# Patient Record
Sex: FEMALE | Race: BLACK OR AFRICAN AMERICAN | Employment: OTHER | URBAN - METROPOLITAN AREA
[De-identification: names, ages, dates, MRNs, and addresses within clinical notes are randomized per-mention and may not be internally consistent; named-entity substitution may affect disease eponyms.]

---

## 2017-02-15 DIAGNOSIS — E11.8 CONTROLLED TYPE 2 DIABETES MELLITUS WITH COMPLICATION, WITHOUT LONG-TERM CURRENT USE OF INSULIN (HCC): ICD-10-CM

## 2017-02-15 DIAGNOSIS — I15.0 RENOVASCULAR HYPERTENSION: ICD-10-CM

## 2017-02-15 DIAGNOSIS — M62.830 SPASM OF LUMBAR PARASPINOUS MUSCLE: ICD-10-CM

## 2017-02-15 RX ORDER — CYCLOBENZAPRINE HCL 10 MG
10 TABLET ORAL
Qty: 30 TAB | Refills: 3 | OUTPATIENT
Start: 2017-02-15

## 2017-02-15 RX ORDER — METFORMIN HYDROCHLORIDE 1000 MG/1
1000 TABLET ORAL 2 TIMES DAILY WITH MEALS
Qty: 60 TAB | Refills: 3 | OUTPATIENT
Start: 2017-02-15

## 2017-02-15 RX ORDER — NAPROXEN 500 MG/1
500 TABLET ORAL
Qty: 60 TAB | Refills: 3 | OUTPATIENT
Start: 2017-02-15

## 2017-02-15 RX ORDER — GLIPIZIDE 10 MG/1
10 TABLET ORAL 2 TIMES DAILY
Qty: 60 TAB | Refills: 3 | OUTPATIENT
Start: 2017-02-15

## 2017-02-15 RX ORDER — LISINOPRIL AND HYDROCHLOROTHIAZIDE 20; 25 MG/1; MG/1
1 TABLET ORAL DAILY
Qty: 30 TAB | Refills: 3 | OUTPATIENT
Start: 2017-02-15

## 2017-02-23 ENCOUNTER — LAB ONLY (OUTPATIENT)
Dept: FAMILY MEDICINE CLINIC | Age: 62
End: 2017-02-23

## 2017-02-23 ENCOUNTER — HOSPITAL ENCOUNTER (OUTPATIENT)
Dept: LAB | Age: 62
Discharge: HOME | End: 2017-02-23

## 2017-02-23 DIAGNOSIS — E11.8 CONTROLLED DIABETES MELLITUS TYPE 2 WITH COMPLICATIONS, UNSPECIFIED LONG TERM INSULIN USE STATUS: ICD-10-CM

## 2017-02-23 DIAGNOSIS — E78.5 HYPERLIPIDEMIA WITH TARGET LDL LESS THAN 100: Primary | ICD-10-CM

## 2017-02-23 DIAGNOSIS — E78.5 HYPERLIPIDEMIA WITH TARGET LDL LESS THAN 100: ICD-10-CM

## 2017-02-23 LAB
ALBUMIN SERPL BCP-MCNC: 4 G/DL (ref 3.4–5)
ALBUMIN/GLOB SERPL: 1.2 {RATIO} (ref 0.8–1.7)
ALP SERPL-CCNC: 83 U/L (ref 45–117)
ALT SERPL-CCNC: 23 U/L (ref 13–56)
ANION GAP BLD CALC-SCNC: 7 MMOL/L (ref 3–18)
AST SERPL W P-5'-P-CCNC: 19 U/L (ref 15–37)
BASOPHILS # BLD AUTO: 0 K/UL (ref 0–0.1)
BASOPHILS # BLD: 1 % (ref 0–2)
BILIRUB SERPL-MCNC: 0.6 MG/DL (ref 0.2–1)
BUN SERPL-MCNC: 23 MG/DL (ref 7–18)
BUN/CREAT SERPL: 19 (ref 12–20)
CALCIUM SERPL-MCNC: 9.4 MG/DL (ref 8.5–10.1)
CHLORIDE SERPL-SCNC: 105 MMOL/L (ref 100–108)
CHOLEST SERPL-MCNC: 149 MG/DL
CO2 SERPL-SCNC: 28 MMOL/L (ref 21–32)
CREAT SERPL-MCNC: 1.24 MG/DL (ref 0.6–1.3)
CREAT UR-MCNC: 169 MG/DL (ref 30–125)
DIFFERENTIAL METHOD BLD: ABNORMAL
EOSINOPHIL # BLD: 0.1 K/UL (ref 0–0.4)
EOSINOPHIL NFR BLD: 2 % (ref 0–5)
ERYTHROCYTE [DISTWIDTH] IN BLOOD BY AUTOMATED COUNT: 13.1 % (ref 11.6–14.5)
EST. AVERAGE GLUCOSE BLD GHB EST-MCNC: 186 MG/DL
GLOBULIN SER CALC-MCNC: 3.4 G/DL (ref 2–4)
GLUCOSE SERPL-MCNC: 107 MG/DL (ref 74–99)
HBA1C MFR BLD: 8.1 % (ref 4.2–5.6)
HCT VFR BLD AUTO: 32.9 % (ref 35–45)
HDLC SERPL-MCNC: 69 MG/DL (ref 40–60)
HDLC SERPL: 2.2 {RATIO} (ref 0–5)
HGB BLD-MCNC: 10.3 G/DL (ref 12–16)
LDLC SERPL CALC-MCNC: 65.4 MG/DL (ref 0–100)
LIPID PROFILE,FLP: ABNORMAL
LYMPHOCYTES # BLD AUTO: 33 % (ref 21–52)
LYMPHOCYTES # BLD: 2.4 K/UL (ref 0.9–3.6)
MAGNESIUM SERPL-MCNC: 1.9 MG/DL (ref 1.8–2.4)
MCH RBC QN AUTO: 27.2 PG (ref 24–34)
MCHC RBC AUTO-ENTMCNC: 31.3 G/DL (ref 31–37)
MCV RBC AUTO: 86.8 FL (ref 74–97)
MICROALBUMIN UR-MCNC: 1.44 MG/DL (ref 0–3)
MICROALBUMIN/CREAT UR-RTO: 9 MG/G (ref 0–30)
MONOCYTES # BLD: 0.7 K/UL (ref 0.05–1.2)
MONOCYTES NFR BLD AUTO: 10 % (ref 3–10)
NEUTS SEG # BLD: 4.1 K/UL (ref 1.8–8)
NEUTS SEG NFR BLD AUTO: 54 % (ref 40–73)
PLATELET # BLD AUTO: 328 K/UL (ref 135–420)
PMV BLD AUTO: 10.6 FL (ref 9.2–11.8)
POTASSIUM SERPL-SCNC: 4.1 MMOL/L (ref 3.5–5.5)
PROT SERPL-MCNC: 7.4 G/DL (ref 6.4–8.2)
RBC # BLD AUTO: 3.79 M/UL (ref 4.2–5.3)
SODIUM SERPL-SCNC: 140 MMOL/L (ref 136–145)
TRIGL SERPL-MCNC: 73 MG/DL (ref ?–150)
VLDLC SERPL CALC-MCNC: 14.6 MG/DL
WBC # BLD AUTO: 7.4 K/UL (ref 4.6–13.2)

## 2017-02-23 PROCEDURE — 80061 LIPID PANEL: CPT | Performed by: NURSE PRACTITIONER

## 2017-02-23 PROCEDURE — 83036 HEMOGLOBIN GLYCOSYLATED A1C: CPT | Performed by: NURSE PRACTITIONER

## 2017-02-23 PROCEDURE — 80053 COMPREHEN METABOLIC PANEL: CPT | Performed by: NURSE PRACTITIONER

## 2017-02-23 PROCEDURE — 82043 UR ALBUMIN QUANTITATIVE: CPT | Performed by: NURSE PRACTITIONER

## 2017-02-23 PROCEDURE — 83735 ASSAY OF MAGNESIUM: CPT | Performed by: NURSE PRACTITIONER

## 2017-02-23 PROCEDURE — 85025 COMPLETE CBC W/AUTO DIFF WBC: CPT | Performed by: NURSE PRACTITIONER

## 2017-02-27 NOTE — PROGRESS NOTES
Will review results with pt at 3/2/17 appt  1. A1C 8.1   2. Lipid panel OK   3. GFR 53 Cr 1.24   4. BUN 23   5. Anemic.

## 2017-03-02 ENCOUNTER — OFFICE VISIT (OUTPATIENT)
Dept: FAMILY MEDICINE CLINIC | Age: 62
End: 2017-03-02

## 2017-03-02 VITALS
SYSTOLIC BLOOD PRESSURE: 113 MMHG | WEIGHT: 203.8 LBS | RESPIRATION RATE: 18 BRPM | TEMPERATURE: 98.9 F | HEIGHT: 63 IN | DIASTOLIC BLOOD PRESSURE: 74 MMHG | BODY MASS INDEX: 36.11 KG/M2 | OXYGEN SATURATION: 100 % | HEART RATE: 102 BPM

## 2017-03-02 DIAGNOSIS — Z12.39 BREAST CANCER SCREENING: ICD-10-CM

## 2017-03-02 DIAGNOSIS — I15.0 RENOVASCULAR HYPERTENSION: ICD-10-CM

## 2017-03-02 DIAGNOSIS — R94.4 DECREASED GFR: ICD-10-CM

## 2017-03-02 DIAGNOSIS — E11.8 TYPE 2 DIABETES MELLITUS WITH COMPLICATION, WITHOUT LONG-TERM CURRENT USE OF INSULIN (HCC): Primary | ICD-10-CM

## 2017-03-02 DIAGNOSIS — Z23 ENCOUNTER FOR IMMUNIZATION: ICD-10-CM

## 2017-03-02 DIAGNOSIS — M62.830 SPASM OF LUMBAR PARASPINOUS MUSCLE: ICD-10-CM

## 2017-03-02 DIAGNOSIS — D64.9 ANEMIA, UNSPECIFIED TYPE: ICD-10-CM

## 2017-03-02 DIAGNOSIS — E66.9 OBESITY (BMI 30-39.9): ICD-10-CM

## 2017-03-02 DIAGNOSIS — Z78.0 POSTMENOPAUSAL: ICD-10-CM

## 2017-03-02 DIAGNOSIS — E78.5 HYPERLIPIDEMIA WITH TARGET LDL LESS THAN 100: ICD-10-CM

## 2017-03-02 RX ORDER — LISINOPRIL AND HYDROCHLOROTHIAZIDE 20; 25 MG/1; MG/1
1 TABLET ORAL DAILY
Qty: 30 TAB | Refills: 3 | Status: SHIPPED | OUTPATIENT
Start: 2017-03-02 | End: 2017-06-01 | Stop reason: DRUGHIGH

## 2017-03-02 RX ORDER — LANOLIN ALCOHOL/MO/W.PET/CERES
325 CREAM (GRAM) TOPICAL
Qty: 30 TAB | Refills: 11 | Status: SHIPPED | OUTPATIENT
Start: 2017-03-02 | End: 2018-06-11 | Stop reason: SDUPTHER

## 2017-03-02 RX ORDER — GLIPIZIDE 10 MG/1
10 TABLET ORAL 2 TIMES DAILY
Qty: 60 TAB | Refills: 3 | Status: SHIPPED | OUTPATIENT
Start: 2017-03-02 | End: 2017-06-01 | Stop reason: SDUPTHER

## 2017-03-02 RX ORDER — METFORMIN HYDROCHLORIDE 1000 MG/1
1000 TABLET ORAL 2 TIMES DAILY WITH MEALS
Qty: 60 TAB | Refills: 3 | Status: SHIPPED | OUTPATIENT
Start: 2017-03-02 | End: 2017-06-01 | Stop reason: SDUPTHER

## 2017-03-02 RX ORDER — CYCLOBENZAPRINE HCL 10 MG
10 TABLET ORAL
Qty: 30 TAB | Refills: 3 | Status: SHIPPED | OUTPATIENT
Start: 2017-03-02 | End: 2017-06-19 | Stop reason: SDUPTHER

## 2017-03-02 NOTE — MR AVS SNAPSHOT
Visit Information Date & Time Provider Department Dept. Phone Encounter #  
 3/2/2017 10:30 AM Michelle Joshi NP [address] [facility] [id number] Follow-up Instructions Return in about 3 months (around 6/2/2017), or if symptoms worsen or fail to improve. Upcoming Health Maintenance Date Due DTaP/Tdap/Td series (1 - Tdap) 10/16/1976 BREAST CANCER SCRN MAMMOGRAM 10/16/2005 FOBT Q 1 YEAR AGE 50-75 10/16/2005 ZOSTER VACCINE AGE 60> 10/16/2015 INFLUENZA AGE 9 TO ADULT 8/1/2016 Pneumococcal 19-64 Medium Risk (1 of 1 - PPSV23) 3/2/2018* EYE EXAM RETINAL OR DILATED Q1 4/6/2017 PAP AKA CERVICAL CYTOLOGY 7/9/2017 HEMOGLOBIN A1C Q6M 8/23/2017 MICROALBUMIN Q1 2/23/2018 LIPID PANEL Q1 2/23/2018 FOOT EXAM Q1 3/2/2018 *Topic was postponed. The date shown is not the original due date. Allergies as of 3/2/2017  Review Complete On: 3/2/2017 By: Xavier Fischer LPN Severity Noted Reaction Type Reactions Codeine  11/28/2012    Nausea and Vomiting Januvia [Sitagliptin]  07/09/2014   Side Effect Nausea Only, Myalgia Pcn [Penicillins]  11/28/2012    Swelling Current Immunizations  Reviewed on 11/28/2012 Name Date Influenza Vaccine Split 11/28/2012 Not reviewed this visit You Were Diagnosed With   
  
 Codes Comments Type 2 diabetes mellitus with complication, without long-term current use of insulin (HCC)    -  Primary ICD-10-CM: E11.8 ICD-9-CM: 250.90 Postmenopausal     ICD-10-CM: Z78.0 ICD-9-CM: V49.81 Breast cancer screening     ICD-10-CM: Z12.39 
ICD-9-CM: V76.10 Anemia, unspecified type     ICD-10-CM: D64.9 ICD-9-CM: 285.9 Hyperlipidemia with target LDL less than 100     ICD-10-CM: E78.5 ICD-9-CM: 272.4 Decreased GFR     ICD-10-CM: R94.4 ICD-9-CM: 794.4 Obesity (BMI 30-39. 9)     ICD-10-CM: E66.9 ICD-9-CM: 278.00   
 Encounter for immunization     ICD-10-CM: V64 ICD-9-CM: V03.89 Spasm of lumbar paraspinous muscle     ICD-10-CM: E77.688 ICD-9-CM: 724.8 Vitals BP  
  
  
  
  
  
 113/74 (BP 1 Location: Left arm, BP Patient Position: Sitting) Vitals History BMI and BSA Data Body Mass Index Body Surface Area  
 36.1 kg/m 2 2.03 m 2 Preferred Pharmacy Pharmacy Name Phone [facility] Jenise Woody [address]. [phone] Your Updated Medication List  
  
   
This list is accurate as of: 3/2/17 10:59 AM.  Always use your most recent med list.  
  
  
  
  
 cyclobenzaprine 10 mg tablet Commonly known as:  FLEXERIL Take 1 Tab by mouth nightly as needed for Muscle Spasm(s). Indications: MUSCLE SPASM  
  
 ferrous sulfate 325 mg (65 mg iron) tablet Take 1 Tab by mouth Daily (before breakfast). Indications: IRON DEFICIENCY ANEMIA  
  
 glipiZIDE 10 mg tablet Commonly known as:  Myrla Nordmann Take 1 Tab by mouth two (2) times a day. lisinopril-hydroCHLOROthiazide 20-25 mg per tablet Commonly known as:  Conda Franc Take 1 Tab by mouth daily. magnesium oxide 400 mg tablet Commonly known as:  MAG-OX Take 1 Tab by mouth daily. metFORMIN 1,000 mg tablet Commonly known as:  GLUCOPHAGE Take 1 Tab by mouth two (2) times daily (with meals). naproxen 500 mg tablet Commonly known as:  NAPROSYN Take 1 Tab by mouth two (2) times daily as needed. Indications: PAIN  
  
 pitavastatin 2 mg tablet Commonly known as:  LIVALO Take 1 Tab by mouth daily. rosuvastatin 20 mg tablet Commonly known as:  CRESTOR Take 1 Tab by mouth nightly. Prescriptions Printed Refills  
 pitavastatin (LIVALO) 2 mg tablet 0 Sig: Take 1 Tab by mouth daily. Class: Program  
 Route: Oral  
  
Prescriptions Sent to Mail Order  Refills  
 pitavastatin (LIVALO) 2 mg tablet 0  
 Sig: Take 1 Tab by mouth daily. Class: Program  
 Pharmacy: [place] [address] [address] [facility]. Ph #: [phone] Route: Oral  
  
Prescriptions Sent to Pharmacy Refills  
 pitavastatin (LIVALO) 2 mg tablet 0 Sig: Take 1 Tab by mouth daily. Class: Program  
 Pharmacy: [place] [address] [address] [facility]. Ph #: [phone] Route: Oral  
 ferrous sulfate 325 mg (65 mg iron) tablet 11 Sig: Take 1 Tab by mouth Daily (before breakfast). Indications: IRON DEFICIENCY ANEMIA Class: Normal  
 Pharmacy: [place] [address]. Ph #: [phone] Route: Oral  
 glipiZIDE (GLUCOTROL) 10 mg tablet 3 Sig: Take 1 Tab by mouth two (2) times a day. Class: Normal  
 Pharmacy: [place] [address]. Ph #: [phone] Route: Oral  
 metFORMIN (GLUCOPHAGE) 1,000 mg tablet 3 Sig: Take 1 Tab by mouth two (2) times daily (with meals). Class: Normal  
 Pharmacy: [place] [address]. Ph #: [phone] Route: Oral  
 cyclobenzaprine (FLEXERIL) 10 mg tablet 3 Sig: Take 1 Tab by mouth nightly as needed for Muscle Spasm(s). Indications: MUSCLE SPASM Class: Normal  
 Pharmacy: [place] [address]. Ph #: [phone] Route: Oral  
  
We Performed the Following [address] [TVF33 Custom] Comments:  
 Referral to Every [address]  Program 
Phone [phone] Fax [phone] [facility] GUIDELINES 
 
18-64 y.o. with problem 38-51 y.o. screening available but on waitlist 
52-64 y.o. screening appt will be scheduled Patient calls for financial screening and appt. [phone] Follow-up Instructions Return in about 3 months (around 6/2/2017), or if symptoms worsen or fail to improve.   
  
To-Do List   
 06/30/2017 Lab:  CBC WITH AUTOMATED DIFF   
  
 06/30/2017 Lab:  HEMOGLOBIN A1C WITH EAG   
  
 06/30/2017 Lab:  LIPID PANEL   
  
 06/30/2017 Lab:  METABOLIC PANEL, COMPREHENSIVE   
  
 06/30/2017 Lab:  MICROALBUMIN, UR, RAND W/ MICROALBUMIN/CREA RATIO Referral Information Referral ID Referred By Referred To  
  
 [id number] Stephanie MCKEON Not Available Visits Status Start Date End Date 1 New Request 3/2/17 3/2/18 If your referral has a status of pending review or denied, additional information will be sent to support the outcome of this decision. Patient Instructions Anemia: Care Instructions Your Care Instructions Anemia is a low level of red blood cells, which carry oxygen throughout your body. Many things can cause anemia. Lack of iron is one of the most common causes. Your body needs iron to make hemoglobin, a substance in red blood cells that carries oxygen from the lungs to your body's cells. Without enough iron, the body produces fewer and smaller red blood cells. As a result, your body's cells do not get enough oxygen, and you feel tired and weak. And you may have trouble concentrating. Bleeding is the most common cause of a lack of iron. You may have heavy menstrual bleeding or bleeding caused by conditions such as ulcers, hemorrhoids, or cancer. Regular use of aspirin or other anti-inflammatory medicines (such as ibuprofen) also can cause bleeding in some people. A lack of iron in your diet also can cause anemia, especially at times when the body needs more iron, such as during pregnancy, infancy, and the teen years. Your doctor may have prescribed iron pills. It may take several months of treatment for your iron levels to return to normal. Your doctor also may suggest that you eat foods that are rich in iron, such as meat and beans. There are many other causes of anemia.  It is not always due to a lack of iron. Finding the specific cause of your anemia will help your doctor find the right treatment for you. Follow-up care is a key part of your treatment and safety. Be sure to make and go to all appointments, and call your doctor if you are having problems. It's also a good idea to know your test results and keep a list of the medicines you take. How can you care for yourself at home? · Take your medicines exactly as prescribed. Call your doctor if you think you are having a problem with your medicine. · If your doctor recommends iron pills, take them as directed: ¨ Try to take the pills on an empty stomach about 1 hour before or 2 hours after meals. But you may need to take iron with food to avoid an upset stomach. ¨ Do not take antacids or drink milk or caffeine drinks (such as coffee, tea, or cola) at the same time or within 2 hours of the time that you take your iron. They can make it hard for your body to absorb the iron. ¨ Vitamin C (from food or supplements) helps your body absorb iron. Try taking iron pills with a glass of orange juice or some other food that is high in vitamin C, such as citrus fruits. ¨ Iron pills may cause stomach problems, such as heartburn, nausea, diarrhea, constipation, and cramps. Be sure to drink plenty of fluids, and include fruits, vegetables, and fiber in your diet each day. Iron pills often make your bowel movements dark or green. ¨ If you forget to take an iron pill, do not take a double dose of iron the next time you take a pill. ¨ Keep iron pills out of the reach of small children. An overdose of iron can be very dangerous. · Follow your doctor's advice about eating iron-rich foods. These include red meat, shellfish, poultry, eggs, beans, raisins, whole-grain bread, and leafy green vegetables. · Steam vegetables to help them keep their iron content. When should you call for help? Call 911 anytime you think you may need emergency care. For example, call if: · You have symptoms of a heart attack. These may include: ¨ Chest pain or pressure, or a strange feeling in the chest. 
¨ Sweating. ¨ Shortness of breath. ¨ Nausea or vomiting. ¨ Pain, pressure, or a strange feeling in the back, neck, jaw, or upper belly or in one or both shoulders or arms. ¨ Lightheadedness or sudden weakness. ¨ A fast or irregular heartbeat. After you call 911, the  may tell you to chew 1 adult-strength or 2 to 4 low-dose aspirin. Wait for an ambulance. Do not try to drive yourself. · You passed out (lost consciousness). Call your doctor now or seek immediate medical care if: 
· You have new or increased shortness of breath. · You are dizzy or lightheaded, or you feel like you may faint. · Your fatigue and weakness continue or get worse. · You have any abnormal bleeding, such as: 
¨ Nosebleeds. ¨ Vaginal bleeding that is different (heavier, more frequent, at a different time of the month) than what you are used to. ¨ Bloody or black stools, or rectal bleeding. ¨ Bloody or pink urine. Watch closely for changes in your health, and be sure to contact your doctor if: 
· You do not get better as expected. Where can you learn more? Go to http://dixon-edilson.info/. Enter R301 in the search box to learn more about \"Anemia: Care Instructions. \" Current as of: February 5, 2016 Content Version: 11.1 © 9635-5862 [employer]. Care instructions adapted under license by [employer] (which disclaims liability or warranty for this information). If you have questions about a medical condition or this instruction, always ask your healthcare professional. [place] [address] any warranty or liability for your use of this information. Learning About Diabetes Food Guidelines Your Care Instructions Meal planning is important to manage diabetes.  It helps keep your blood sugar at a target level (which you set with your doctor). You don't have to eat special foods. You can eat what your family eats, including sweets once in a while. But you do have to pay attention to how often you eat and how much you eat of certain foods. You may want to work with a dietitian or a certified diabetes educator (CDE) to help you plan meals and snacks. A dietitian or CDE can also help you lose weight if that is one of your goals. What should you know about eating carbs? Managing the amount of carbohydrate (carbs) you eat is an important part of healthy meals when you have diabetes. Carbohydrate is found in many foods. · Learn which foods have carbs. And learn the amounts of carbs in different foods. ¨ Bread, cereal, pasta, and rice have about 15 grams of carbs in a serving. A serving is 1 slice of bread (1 ounce), ½ cup of cooked cereal, or 1/3 cup of cooked pasta or rice. ¨ Fruits have 15 grams of carbs in a serving. A serving is 1 small fresh fruit, such as an apple or orange; ½ of a banana; ½ cup of cooked or canned fruit; ½ cup of fruit juice; 1 cup of melon or raspberries; or 2 tablespoons of dried fruit. ¨ Milk and no-sugar-added yogurt have 15 grams of carbs in a serving. A serving is 1 cup of milk or 2/3 cup of no-sugar-added yogurt. ¨ Starchy vegetables have 15 grams of carbs in a serving. A serving is ½ cup of mashed potatoes or sweet potato; 1 cup winter squash; ½ of a small baked potato; ½ cup of cooked beans; or ½ cup cooked corn or green peas. · Learn how much carbs to eat each day and at each meal. A dietitian or CDE can teach you how to keep track of the amount of carbs you eat. This is called carbohydrate counting. · If you are not sure how to count carbohydrate grams, use the Plate Method to plan meals. It is a good, quick way to make sure that you have a balanced meal. It also helps you spread carbs throughout the day. ¨ Divide your plate by types of foods. Put non-starchy vegetables on half the plate, meat or other protein food on one-quarter of the plate, and a grain or starchy vegetable in the final quarter of the plate. To this you can add a small piece of fruit and 1 cup of milk or yogurt, depending on how many carbs you are supposed to eat at a meal. 
· Try to eat about the same amount of carbs at each meal. Do not \"save up\" your daily allowance of carbs to eat at one meal. 
· Proteins have very little or no carbs per serving. Examples of proteins are beef, chicken, turkey, fish, eggs, tofu, cheese, cottage cheese, and peanut butter. A serving size of meat is 3 ounces, which is about the size of a deck of cards. Examples of meat substitute serving sizes (equal to 1 ounce of meat) are 1/4 cup of cottage cheese, 1 egg, 1 tablespoon of peanut butter, and ½ cup of tofu. How can you eat out and still eat healthy? · Learn to estimate the serving sizes of foods that have carbohydrate. If you measure food at home, it will be easier to estimate the amount in a serving of restaurant food. · If the meal you order has too much carbohydrate (such as potatoes, corn, or baked beans), ask to have a low-carbohydrate food instead. Ask for a salad or green vegetables. · If you use insulin, check your blood sugar before and after eating out to help you plan how much to eat in the future. · If you eat more carbohydrate at a meal than you had planned, take a walk or do other exercise. This will help lower your blood sugar. What else should you know? · Limit saturated fat, such as the fat from meat and dairy products. This is a healthy choice because people who have diabetes are at higher risk of heart disease. So choose lean cuts of meat and nonfat or low-fat dairy products. Use olive or canola oil instead of butter or shortening when cooking. · Don't skip meals.  Your blood sugar may drop too low if you skip meals and take insulin or certain medicines for diabetes. · Check with your doctor before you drink alcohol. Alcohol can cause your blood sugar to drop too low. Alcohol can also cause a bad reaction if you take certain diabetes medicines. Follow-up care is a key part of your treatment and safety. Be sure to make and go to all appointments, and call your doctor if you are having problems. It's also a good idea to know your test results and keep a list of the medicines you take. Where can you learn more? Go to http://dixon-edilson.info/. Enter X202 in the search box to learn more about \"Learning About Diabetes Food Guidelines. \" Current as of: May 23, 2016 Content Version: 11.1 © 1135-7053 [employer]. Care instructions adapted under license by Privy (which disclaims liability or warranty for this information). If you have questions about a medical condition or this instruction, always ask your healthcare professional. [place] [address] any warranty or liability for your use of this information. The [facility] reminders! Foundation Operating Hours: These may change without notice. Mon- Wed 7am to 5pm. Closed for lunch 12-1pm 
Thurs 7am to 12pm 
Fridays closed NO SHOW POLICY ~ If a patient has 6 no shows for an appointment with the Provider, Mental Health Provider, or the Nurse Navigator in 6 months, they will be discharged from the practice for 6 months. Medication ordering will also be suspended. If the patient is discharged from the [facility], they can go to the [place] [address] where they can be seen for the primary needs plus obtain the same types of medications as they receive at the [facility]. To avoid being discharged, the patient must call the office at [phone] 24 hours prior to their appointment if they need to cancel, arrive to their appointments on time and come to all scheduled appointments. If the patient is discharged from the practice, they can apply to be re-established after 12 months. Lab work:  Unless you are instructed differently, please return to the office between the hours of 7 am and 10:30 am Monday through Thursday to have your labs drawn one week before your next scheduled PROVIDER visit. If you do not have an appointment to follow up on these results, please make one or plan to call the office if you do not hear from us to get the results. No news does not mean good news. Medications: If your medications are new or have changed, and you get your medications from the clinic pharmacy ([facility]), you MUST talk to the pharmacy staff to sign the new prescription applications. If you don't sign the applications we cannot get the medications for you. It usually takes 6-8 weeks for your medications to arrive. The Pharmacy staff will call you when your medications are available. You will have 30 days to come in and  your medications. If you don't  your medicines within those 30 days, those medicines will be placed on the self as samples and you will have to start all over again by completing the applications and waiting the 6-8 weeks for your medicines to arrive. This is firm and there will be no exceptions! ! The [facility] or [facility] will assist you with your medications if available but not all medications are available so some may be obtained through local pharmacies such as [facility] that has a large $4 list and [facility] that has many drugs for free or also for $4.  We will work hard to get the best medications for you that you can also afford. Feet Care: Local ministry through [facility] Every second Tuesday of the month (except for holidays and election days) from 9am to 1 pm. The services provided by these ministry volunteers are free of charge with the option to donate.  They will inspect your feet thoroughly, soak them for 10 minutes, cut and file your nails. They care for diabetics as well. Keep in mind this service is free and will be on a first come first serve basis. Bad teeth? Ask about the Dental Bus to get you in front of a local dentist. The bus leaves every other Wednesday for those on the list. (Ask about availability as these appointments are limited) Eye exams for Diabetics. Please let us know so we can add you to the list to see the eye doctor at [facility]. You will receive a free eye exam and free glasses if needed. Unfortunately, if you are not a diabetic, we do not have a free service for eye exams for you (yet!). We do have information on where to go to get a huge discount on eye exams and glasses. Sick visits: If you are sick and it is not an emergency call the office to see if you can schedule an appointment. Charges and cost items from the clinic:  Most of our orders are covered by [employer] but there ARE SOME CHARGES for items such as radiology interpretations and anesthesiology during procedures and surgeries. Please make sure you have contacted the Advanced Patient Advocacy (APA) group to check on your payment options: [email or web address]. or come in and talk to them in person: On 
Tuesdays, we have Advanced Patient Advocacy is available Mon - Fri 8-4pm at [facility] on the first floor by the information desk. Their number is [phone]. It is important that you are screened in order to qualify for assistance and to avoid huge medical charges. The [facility] is not responsible for ANY charges you may accrue regardless of who ordered the medication, procedure, treatment or test. If you go to the Emergency Room, you WILL be charged! Behavior and emotional issues! It is stressful to be sick, have an illness, take medications, not have a job, not have medical insurance, have family issues or just getting older!   Schedule an appointment with our mental health provider. She is in the office Mondays and Wednesdays from 8am to [place] [address] can also contact the following: The national suicide hotline (2-299-093-PKVY or 5-459.683.6751) [address] [facility] [address] [facility], [address] [facility] 
[phone] [employer] ([employer]) - [place] [address] [facility], [address] 
[phone] Immunizations/Vaccination Routine Immunizations are provided for infants, children, teens, and adults at the [facility]. Please bring all immunization records with you and present them at the time of registration. Phone [phone] Hours (Walk in) Monday, Tuesday, Wednesday and Friday 8:00 AM to 11:00 AM 
12:30 PM to 3:00 PM 
 
 
Drug and Alcohol Addiction Issues! It is hard to stop a poor habit but there is help out there. Please feel free to attend any other the following support groups to help you kick the habit or go to [facility] Emergency Department to be evaluated by the psychiatric team. Never give up!! AlAnon meetings: Cristhian Gillis, Ari davis [place] MONDAY 7:30 PM JUST FOR TODAY [employer] [facility] [place] [facility] WEDNESDAY 10:30 AM [place] [employer] [employer] [facility], ROOM [address] [place] [address]  
 
[facility] WEDNESDAY 8:00 PM Cecy Brewster [facility] THURSDAY 8:00 PM KEEP IT SIMPLE [employer] [facility]on [facility] [address] 8:00 PM LET IT BEGIN WITH ME [employer] Al-Anon [facility] [address] [facility] FRIDAY 6;30 PM [place] PARENTS [employer] [place] [facility] [address] [facility] [address] [place] MONDAY 10:30  [place] [address] [place] SATURDAY 8:00 PM [facility] SATURDAY NIGHT [employer] Al-Anon [facility] [address] [facility] [place] MONDAY 7:00 PM MONDAY NIGHT [employer]G Mary [facility] [address]  
 
[facility] WEDNES[facility]:00 PM [employer]EN[employer] SEEK[employer]-[place] [facility] [address] [facility] & [facility]! Melodie Lang introduces [employer] patient portal. Now you can access parts of your medical record, email your doctor's office, and request medication refills online. 1. In your internet browser, go to [email or web address] 2. Click on the First Time User? Click Here link in the Sign In box. You will see the New Member Sign Up page. 3. Enter your [employer] Access Code exactly as it appears below. You will not need to use this code after youve completed the sign-up process. If you do not sign up before the expiration date, you must request a new code. · [employer] Access Code: [email or web address] Expires: 5/31/2017 10:55 AM 
 
4. Enter the last four digits of your Social Security Number (xxxx) and Date of Birth (mm/dd/yyyy) as indicated and click Submit. You will be taken to the next sign-up page. 5. Create a [employer] ID. This will be your [employer] login ID and cannot be changed, so think of one that is secure and easy to remember. 6. Create a [employer] password. You can change your password at any time. 7. Enter your Password Reset Question and Answer. This can be used at a later time if you forget your password. 8. Enter your e-mail address. You will receive e-mail notification when new information is available in [address] 9. Click Sign Up. You can now view and download portions of your medical record. 10. Click the Download Summary menu link to download a portable copy of your medical information. If you have questions, please visit the Frequently Asked Questions section of the [employer] website. Remember, [employer] is NOT to be used for urgent needs. For medical emergencies, dial 911. Now available from your iPhone and Android! Please provide this summary of care documentation to your next provider. Your primary care clinician is listed as Tena Meyer. If you have any questions after today's visit, please call [phone].

## 2017-03-02 NOTE — PROGRESS NOTES
[address]  [address] [facility], [address] [place] [address] Avenue  [phone] office/[phone] fax      3/2/2017    Reason for visit:   Chief Complaint   Patient presents with    Results    Medication Refill     Patient states that she has been out of prinzide, glucaphage, and glucotrol for 3 days.  Follow Up Chronic Condition       Patient: Connor Paniagua, 1955, [id number]       Primary MD: Lluvia Melton NP    Subjective:   Connor Paniagua, a 64 y.o. female, who presents for Results; Medication Refill (Patient states that she has been out of prinzide, glucaphage, and glucotrol for 3 days.); and Follow Up Chronic Condition      HPI  Diabetes Follow Up     Current symptoms/problems include none and have been stable. Known diabetic complications: none  Cardiovascular risk factors: dyslipidemia, diabetes mellitus, post-menopausal  Current diabetic medications include metformin and glucotrol. Eye exam current (within one year): yes  Weight trend: stable  Prior visit with dietician: no  Current diet: \"unhealthy\" diet in general  Increase intake in sodas  Current exercise: no regular exercise    Current monitoring regimen: none  Home blood sugar records: trend: increasing steadily  Any episodes of hypoglycemia? no    Is She on ACE inhibitor or angiotensin II receptor blocker?    Yes   lisinopril (generic)    Past Medical History:   Diagnosis Date    Anemia     Arthritis     Back pain     Diabetes ([address]     Diabetes mellitus type 2, controlled ([address] 6/30/2016    High cholesterol     Hypertension     Right arm weakness 3/3/15    pending EMG 4/22/15    Right knee DJD 1980    Cortisone shots/Mobic/ original injury    Right sciatic nerve pain 3/3/15    on Dr. 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 30 Tab 3    naproxen (NAPROSYN) 500 mg tablet Take 1 Tab by mouth two (2) times daily as needed. Indications: PAIN 60 Tab 3    magnesium oxide (MAG-OX) 400 mg tablet Take 1 Tab by mouth daily. 30 Tab 11    rosuvastatin (CRESTOR) 20 mg tablet Take 1 Tab by mouth nightly. 90 Tab 3     No current facility-administered medications on file prior to visit. Review of Systems   Constitutional: Negative. HENT: Negative. Eyes: Negative. Respiratory: Negative. Cardiovascular: Negative. Gastrointestinal: Positive for nausea. Negative for abdominal pain, blood in stool, constipation, diarrhea, heartburn, melena and vomiting. Genitourinary: Negative. Musculoskeletal: Positive for back pain and joint pain (Right Knee pain ). Skin: Negative. Neurological: Negative. Endo/Heme/Allergies: Negative. Psychiatric/Behavioral: Negative.         Objective:   Visit Vitals    /74 (BP 1 Location: Left arm, BP Patient Position: Sitting)    Pulse (!) 102    Temp 98.9 °F (37.2 °C) (Oral)    Resp 18    Ht 5' 3\" (1.6 m)    Wt 203 lb 12.8 oz (92.4 kg)    SpO2 100%    BMI 36.1 kg/m2      Wt Readings from Last 3 Encounters:   03/02/17 203 lb 12.8 oz (92.4 kg)   03/22/16 199 lb 9.6 oz (90.5 kg)   10/14/15 204 lb 6.4 oz (92.7 kg)     Lab Results   Component Value Date/Time    Glucose 107 02/23/2017 08:20 AM    Glucose  03/03/2015 09:34 AM         Physical Exam   Constitutional: She is oriented to person, place, and time. She appears well-developed and well-nourished. HENT:   Head: Normocephalic. Eyes: Pupils are equal, round, and reactive to light. Neck: Normal range of motion. Neck supple. No JVD present. Carotid bruit is not present. Cardiovascular: Normal rate, regular rhythm, normal heart sounds and intact distal pulses. No murmur heard. Pulmonary/Chest: Effort normal and breath sounds normal. No respiratory distress. Abdominal: Soft. Bowel sounds are normal.   Musculoskeletal: Normal range of motion. Neurological: She is alert and oriented to person, place, and time. She has normal reflexes. Skin: Skin is warm and dry. Psychiatric: She has a normal mood and affect. Her behavior is normal.   Vitals reviewed. Assessment:    Graciela White who has risk factors including (see above previous medical hx) and:       ICD-10-CM ICD-9-CM    1. Type 2 diabetes mellitus with complication, without long-term current use of insulin (HCC) E11.8 250.90 HEMOGLOBIN A1C WITH EAG      METABOLIC PANEL, COMPREHENSIVE      MICROALBUMIN, UR, RAND W/ MICROALBUMIN/CREA RATIO      LIPID PANEL      glipiZIDE (GLUCOTROL) 10 mg tablet      metFORMIN (GLUCOPHAGE) 1,000 mg tablet   2. Postmenopausal Z78.0 V49.81    3. Breast cancer screening Z12.39 S54.11 REFERRAL TO PUBLIC HEALTH   4. Anemia, unspecified type D64.9 285.9 CBC WITH AUTOMATED DIFF      ferrous sulfate 325 mg (65 mg iron) tablet   5. Hyperlipidemia with target LDL less than 100 E78.5 272.4 pitavastatin (LIVALO) 2 mg tablet   6. Decreased GFR R94.4 794.4    7. Obesity (BMI 30-39. 9) E66.9 278.00    8. Encounter for immunization Z23 V03.89    9.  Spasm of lumbar paraspinous muscle M62.830 724.8 cyclobenzaprine (FLEXERIL) 10 mg tablet     1. Type 2 diabetes mellitus with complication, without long-term current use of insulin ([facility]  -The patient is asked to make an attempt to improve diet and exercise patterns to aid in medical management of this problem. - HEMOGLOBIN A1C WITH EAG; Future  - METABOLIC PANEL, COMPREHENSIVE; Future  - MICROALBUMIN, UR, RAND W/ MICROALBUMIN/CREA RATIO; Future  - LIPID PANEL; Future  - glipiZIDE (GLUCOTROL) 10 mg tablet; Take 1 Tab by mouth two (2) times a day. Dispense: 60 Tab; Refill: 3  - metFORMIN (GLUCOPHAGE) 1,000 mg tablet; Take 1 Tab by mouth two (2) times daily (with meals). Dispense: 60 Tab; Refill: 3    2. Postmenopausal  -Continue multivitamin with vitamin D and calcium     3. Breast cancer screening  - REFERRAL TO PUBLIC HEALTH    4. Anemia, unspecified type  - CBC WITH AUTOMATED DIFF; Future  - ferrous sulfate 325 mg (65 mg iron) tablet; Take 1 Tab by mouth Daily (before breakfast). Indications: IRON DEFICIENCY ANEMIA  Dispense: 30 Tab; Refill: 11    5. Hyperlipidemia with target LDL less than 100  -Pt is currently taking crestor which is no longer available via [facility]. Pt to continue crestor. Pt is to call [facility] 2-4 weeks before running out of crestor to obtain the Livalo. Pt must complete the crestor first then start the Livalo. - pitavastatin (LIVALO) 2 mg tablet; Take 1 Tab by mouth daily. Dispense: 90 Tab; Refill: 0    6. Decreased GFR  -The patient reports that she doesn't take Naproxyn every day and it is sporadic. Educated on importance to limit NSAIDs.   - Educated on better DM control.   -Will continue to monitor since previous GFR was >60 3 months prior    7.  Obesity (BMI 30-39.9)  -Weight management: Discussed importance of maintaining a normal weight through healthy dietary changes and aerobic exercise on a daily basis of 30 min or more to decrease need for additional medications, reduction of blood glucose/blood pressure/ dementia/osteoporosis/stress and depression. Aerobic exercise was described as an activity that makes them sweaty and elevates their heart rate such as walking, running, bike, treadmill, stair climbing, joining a gym or swimming. Discussed the patient's above normal BMI with her. Recommended the following interventions: dietary management education, guidance, counseling, exercise and monitoring weight. BMI is out of normal parameters and plan is as follows: Discussed in great detail on diet, portion control, exercise, avoiding foods high in sugar, carbs and starches, mealtimes and to eat until satisfied not til full. I have counseled this patient on diet and exercise regimens      8. Encounter for immunization  -Flu shot given     9. Spasm of lumbar paraspinous muscle  - cyclobenzaprine (FLEXERIL) 10 mg tablet; Take 1 Tab by mouth nightly as needed for Muscle Spasm(s). Indications: MUSCLE SPASM  Dispense: 30 Tab; Refill: 3      Written instructions followed our verbal discussion of all information discussed above, pending tests ordered and future goals/plans. Patient expressed understanding of current diagnosis, planned testing, follow up and if needed to contact the office for any questions or concerns prior to the next visit. Plan:   Med reconciliation completed with patient. Reviewed side effects of medications with the patient. Questions were answered and patient verb understanding. Discussed lab results with patient  Will review results with pt at 3/2/17 appt  1. A1C 8.1   2. Lipid panel OK   3. GFR 53 Cr 1.24   4. BUN 23   5. Anemic.     Orders Placed This Encounter    HEMOGLOBIN A1C WITH EAG     Standing Status:   Future     Standing Expiration Date:   3/3/2018    CBC WITH AUTOMATED DIFF     Standing Status:   Future     Standing Expiration Date:   9/80/1306    METABOLIC PANEL, COMPREHENSIVE     Standing Status:   Future     Standing Expiration Date:   7/30/2017    MICROALBUMIN, UR, RAND W/ MICROALBUMIN/CREA RATIO     Standing Status:   Future     Standing Expiration Date:   8/9/2017    LIPID PANEL     Standing Status:   Future     Standing Expiration Date:   7/30/2017    REFERRAL TO PUBLIC HEALTH     Referral Priority:   Routine     Referral Type:   Consultation     Referral Reason:   Specialty Services Required    pitavastatin (LIVALO) 2 mg tablet     Sig: Take 1 Tab by mouth daily. Dispense:  90 Tab     Refill:  0    ferrous sulfate 325 mg (65 mg iron) tablet     Sig: Take 1 Tab by mouth Daily (before breakfast). Indications: IRON DEFICIENCY ANEMIA     Dispense:  30 Tab     Refill:  11    glipiZIDE (GLUCOTROL) 10 mg tablet     Sig: Take 1 Tab by mouth two (2) times a day. Dispense:  60 Tab     Refill:  3    metFORMIN (GLUCOPHAGE) 1,000 mg tablet     Sig: Take 1 Tab by mouth two (2) times daily (with meals). Dispense:  60 Tab     Refill:  3    cyclobenzaprine (FLEXERIL) 10 mg tablet     Sig: Take 1 Tab by mouth nightly as needed for Muscle Spasm(s). Indications: MUSCLE SPASM     Dispense:  30 Tab     Refill:  3     Current Outpatient Prescriptions   Medication Sig Dispense Refill    pitavastatin (LIVALO) 2 mg tablet Take 1 Tab by mouth daily. 90 Tab 0    ferrous sulfate 325 mg (65 mg iron) tablet Take 1 Tab by mouth Daily (before breakfast). Indications: IRON DEFICIENCY ANEMIA 30 Tab 11    glipiZIDE (GLUCOTROL) 10 mg tablet Take 1 Tab by mouth two (2) times a day. 60 Tab 3    metFORMIN (GLUCOPHAGE) 1,000 mg tablet Take 1 Tab by mouth two (2) times daily (with meals). 60 Tab 3    cyclobenzaprine (FLEXERIL) 10 mg tablet Take 1 Tab by mouth nightly as needed for Muscle Spasm(s). Indications: MUSCLE SPASM 30 Tab 3    lisinopril-hydrochlorothiazide (PRINZIDE, ZESTORETIC) 20-25 mg per tablet Take 1 Tab by mouth daily. 30 Tab 3    naproxen (NAPROSYN) 500 mg tablet Take 1 Tab by mouth two (2) times daily as needed.  Indications: PAIN 60 Tab 3    magnesium oxide (MAG-OX) 400 mg tablet Take 1 Tab by mouth daily. 30 Tab 11    rosuvastatin (CRESTOR) 20 mg tablet Take 1 Tab by mouth nightly. 90 Tab 3     Medications Discontinued During This Encounter   Medication Reason    ferrous sulfate 325 mg (65 mg iron) tablet Reorder    glipiZIDE (GLUCOTROL) 10 mg tablet Reorder    metFORMIN (GLUCOPHAGE) 1,000 mg tablet Reorder    cyclobenzaprine (FLEXERIL) 10 mg tablet Reorder       Follow-up Disposition:  Return in about 3 months (around 6/2/2017), or if symptoms worsen or fail to improve. Labs needed for follow-up appt    \"No Show policy was reviewed with the patient. The services affected are the nurse navigator and the provider. No show appointments include missing labs for a future scheduled appointment, Pap/pelvics, arriving to appointment more than 10 minutes late, and calling to cancel appointment less than 24 hours in advance. After the 6th No Show, the patient will be removed from the [facility] to include medications for 6 months. The patient will be referred to the [place] [id number] for their primary care needs. \"     Henrietta Khan, [address]      I spent 35 minutes with the patient in face-to-face consultation, of which greater than 50% was spent in counseling and coordination of care as described above.

## 2017-03-02 NOTE — LETTER
3/2/2017 [facility] [address] [facility], [address], 1955, is picking up the following medications ordered from the [facility]: 
 
CRESTOR 20 MG #90 [address] [facility] Patient's Signature: _____________________________ Today's Date: 3/2/2017

## 2017-03-02 NOTE — PATIENT INSTRUCTIONS
Anemia: Care Instructions  Your Care Instructions    Anemia is a low level of red blood cells, which carry oxygen throughout your body. Many things can cause anemia. Lack of iron is one of the most common causes. Your body needs iron to make hemoglobin, a substance in red blood cells that carries oxygen from the lungs to your body's cells. Without enough iron, the body produces fewer and smaller red blood cells. As a result, your body's cells do not get enough oxygen, and you feel tired and weak. And you may have trouble concentrating. Bleeding is the most common cause of a lack of iron. You may have heavy menstrual bleeding or bleeding caused by conditions such as ulcers, hemorrhoids, or cancer. Regular use of aspirin or other anti-inflammatory medicines (such as ibuprofen) also can cause bleeding in some people. A lack of iron in your diet also can cause anemia, especially at times when the body needs more iron, such as during pregnancy, infancy, and the teen years. Your doctor may have prescribed iron pills. It may take several months of treatment for your iron levels to return to normal. Your doctor also may suggest that you eat foods that are rich in iron, such as meat and beans. There are many other causes of anemia. It is not always due to a lack of iron. Finding the specific cause of your anemia will help your doctor find the right treatment for you. Follow-up care is a key part of your treatment and safety. Be sure to make and go to all appointments, and call your doctor if you are having problems. It's also a good idea to know your test results and keep a list of the medicines you take. How can you care for yourself at home? · Take your medicines exactly as prescribed. Call your doctor if you think you are having a problem with your medicine. · If your doctor recommends iron pills, take them as directed:  ¨ Try to take the pills on an empty stomach about 1 hour before or 2 hours after meals. But you may need to take iron with food to avoid an upset stomach. ¨ Do not take antacids or drink milk or caffeine drinks (such as coffee, tea, or cola) at the same time or within 2 hours of the time that you take your iron. They can make it hard for your body to absorb the iron. ¨ Vitamin C (from food or supplements) helps your body absorb iron. Try taking iron pills with a glass of orange juice or some other food that is high in vitamin C, such as citrus fruits. ¨ Iron pills may cause stomach problems, such as heartburn, nausea, diarrhea, constipation, and cramps. Be sure to drink plenty of fluids, and include fruits, vegetables, and fiber in your diet each day. Iron pills often make your bowel movements dark or green. ¨ If you forget to take an iron pill, do not take a double dose of iron the next time you take a pill. ¨ Keep iron pills out of the reach of small children. An overdose of iron can be very dangerous. · Follow your doctor's advice about eating iron-rich foods. These include red meat, shellfish, poultry, eggs, beans, raisins, whole-grain bread, and leafy green vegetables. · Steam vegetables to help them keep their iron content. When should you call for help? Call 911 anytime you think you may need emergency care. For example, call if:  · You have symptoms of a heart attack. These may include:  ¨ Chest pain or pressure, or a strange feeling in the chest.  ¨ Sweating. ¨ Shortness of breath. ¨ Nausea or vomiting. ¨ Pain, pressure, or a strange feeling in the back, neck, jaw, or upper belly or in one or both shoulders or arms. ¨ Lightheadedness or sudden weakness. ¨ A fast or irregular heartbeat. After you call 911, the  may tell you to chew 1 adult-strength or 2 to 4 low-dose aspirin. Wait for an ambulance. Do not try to drive yourself. · You passed out (lost consciousness).   Call your doctor now or seek immediate medical care if:  · You have new or increased shortness of breath. · You are dizzy or lightheaded, or you feel like you may faint. · Your fatigue and weakness continue or get worse. · You have any abnormal bleeding, such as:  ¨ Nosebleeds. ¨ Vaginal bleeding that is different (heavier, more frequent, at a different time of the month) than what you are used to. ¨ Bloody or black stools, or rectal bleeding. ¨ Bloody or pink urine. Watch closely for changes in your health, and be sure to contact your doctor if:  · You do not get better as expected. Where can you learn more? Go to http://dixon-edilson.info/. Enter R301 in the search box to learn more about \"Anemia: Care Instructions. \"  Current as of: February 5, 2016  Content Version: 11.1  © 5460-1768 [employer]. Care instructions adapted under license by [employer] (which disclaims liability or warranty for this information). If you have questions about a medical condition or this instruction, always ask your healthcare professional. [place] [address] any warranty or liability for your use of this information. Learning About Diabetes Food Guidelines  Your Care Instructions  Meal planning is important to manage diabetes. It helps keep your blood sugar at a target level (which you set with your doctor). You don't have to eat special foods. You can eat what your family eats, including sweets once in a while. But you do have to pay attention to how often you eat and how much you eat of certain foods. You may want to work with a dietitian or a certified diabetes educator (CDE) to help you plan meals and snacks. A dietitian or CDE can also help you lose weight if that is one of your goals. What should you know about eating carbs? Managing the amount of carbohydrate (carbs) you eat is an important part of healthy meals when you have diabetes. Carbohydrate is found in many foods. · Learn which foods have carbs.  And learn the amounts of carbs in different foods.  ¨ Bread, cereal, pasta, and rice have about 15 grams of carbs in a serving. A serving is 1 slice of bread (1 ounce), ½ cup of cooked cereal, or 1/3 cup of cooked pasta or rice. ¨ Fruits have 15 grams of carbs in a serving. A serving is 1 small fresh fruit, such as an apple or orange; ½ of a banana; ½ cup of cooked or canned fruit; ½ cup of fruit juice; 1 cup of melon or raspberries; or 2 tablespoons of dried fruit. ¨ Milk and no-sugar-added yogurt have 15 grams of carbs in a serving. A serving is 1 cup of milk or 2/3 cup of no-sugar-added yogurt. ¨ Starchy vegetables have 15 grams of carbs in a serving. A serving is ½ cup of mashed potatoes or sweet potato; 1 cup winter squash; ½ of a small baked potato; ½ cup of cooked beans; or ½ cup cooked corn or green peas. · Learn how much carbs to eat each day and at each meal. A dietitian or CDE can teach you how to keep track of the amount of carbs you eat. This is called carbohydrate counting. · If you are not sure how to count carbohydrate grams, use the Plate Method to plan meals. It is a good, quick way to make sure that you have a balanced meal. It also helps you spread carbs throughout the day. ¨ Divide your plate by types of foods. Put non-starchy vegetables on half the plate, meat or other protein food on one-quarter of the plate, and a grain or starchy vegetable in the final quarter of the plate. To this you can add a small piece of fruit and 1 cup of milk or yogurt, depending on how many carbs you are supposed to eat at a meal.  · Try to eat about the same amount of carbs at each meal. Do not \"save up\" your daily allowance of carbs to eat at one meal.  · Proteins have very little or no carbs per serving. Examples of proteins are beef, chicken, turkey, fish, eggs, tofu, cheese, cottage cheese, and peanut butter. A serving size of meat is 3 ounces, which is about the size of a deck of cards.  Examples of meat substitute serving sizes (equal to 1 ounce of meat) are 1/4 cup of cottage cheese, 1 egg, 1 tablespoon of peanut butter, and ½ cup of tofu. How can you eat out and still eat healthy? · Learn to estimate the serving sizes of foods that have carbohydrate. If you measure food at home, it will be easier to estimate the amount in a serving of restaurant food. · If the meal you order has too much carbohydrate (such as potatoes, corn, or baked beans), ask to have a low-carbohydrate food instead. Ask for a salad or green vegetables. · If you use insulin, check your blood sugar before and after eating out to help you plan how much to eat in the future. · If you eat more carbohydrate at a meal than you had planned, take a walk or do other exercise. This will help lower your blood sugar. What else should you know? · Limit saturated fat, such as the fat from meat and dairy products. This is a healthy choice because people who have diabetes are at higher risk of heart disease. So choose lean cuts of meat and nonfat or low-fat dairy products. Use olive or canola oil instead of butter or shortening when cooking. · Don't skip meals. Your blood sugar may drop too low if you skip meals and take insulin or certain medicines for diabetes. · Check with your doctor before you drink alcohol. Alcohol can cause your blood sugar to drop too low. Alcohol can also cause a bad reaction if you take certain diabetes medicines. Follow-up care is a key part of your treatment and safety. Be sure to make and go to all appointments, and call your doctor if you are having problems. It's also a good idea to know your test results and keep a list of the medicines you take. Where can you learn more? Go to http://dixon-edilson.info/. Enter O836 in the search box to learn more about \"Learning About Diabetes Food Guidelines. \"  Current as of: May 23, 2016  Content Version: 11.1  © 9352-3580 [employer].  Care instructions adapted under license by [employer] (which disclaims liability or warranty for this information). If you have questions about a medical condition or this instruction, always ask your healthcare professional. [address] any warranty or liability for your use of this information. The [facility] Emergency Department reminders! Foundation Operating Hours: These may change without notice. Mon- Wed 7am to 5pm. Closed for lunch 12-1pm  Thurs 7am to 12pm  Fridays closed     NO SHOW POLICY ~ If a patient has 6 no shows for an appointment with the Provider, Mental Health Provider, or the Nurse Navigator in 6 months, they will be discharged from the practice for 6 months. Medication ordering will also be suspended. If the patient is discharged from the [facility], they can go to the [place] [address] where they can be seen for the primary needs plus obtain the same types of medications as they receive at the [facility]. To avoid being discharged, the patient must call the office at [phone] 24 hours prior to their appointment if they need to cancel, arrive to their appointments on time and come to all scheduled appointments. If the patient is discharged from the [facility], they can apply to be re-established after 12 months. Lab work:  Unless you are instructed differently, please return to the office between the hours of 7 am and 10:30 am Monday through Thursday to have your labs drawn one week before your next scheduled PROVIDER visit. If you do not have an appointment to follow up on these results, please make one or plan to call the office if you do not hear from us to get the results. No news does not mean good news. Medications: If your medications are new or have changed, and you get your medications from the clinic pharmacy ([facility]), you MUST talk to the pharmacy staff to sign the new prescription applications. If you don't sign the applications we cannot get the medications for you.  It usually takes 6-8 weeks for your medications to arrive. The Pharmacy staff will call you when your medications are available. You will have 30 days to come in and  your medications. If you don't  your medicines within those 30 days, those medicines will be placed on the self as samples and you will have to start all over again by completing the applications and waiting the 6-8 weeks for your medicines to arrive. This is firm and there will be no exceptions! ! The [facility] or [facility] will assist you with your medications if available but not all medications are available so some may be obtained through local pharmacies such as [facility] that has a large $4 list and [facility] that has many drugs for free or also for $4.  We will work hard to get the best medications for you that you can also afford. Feet Care: Local [facility] through [facility]  Every second Tuesday of the month (except for holidays and election days) from 9am to 1 pm. The services provided by these ministry volunteers are free of charge with the option to donate. They will inspect your feet thoroughly, soak them for 10 minutes, cut and file your nails. They care for diabetics as well. Keep in mind this service is free and will be on a first come first serve basis. Bad teeth? Ask about the Dental Bus to get you in front of a local dentist. The bus leaves every other Wednesday for those on the list. (Ask about availability as these appointments are limited)    Eye exams for Diabetics. Please let us know so we can add you to the list to see the eye doctor at [facility]. You will receive a free eye exam and free glasses if needed. Unfortunately, if you are not a diabetic, we do not have a free service for eye exams for you (yet!). We do have information on where to go to get a huge discount on eye exams and glasses. Sick visits: If you are sick and it is not an emergency call the office to see if you can schedule an appointment. Charges and cost items from the clinic:  Most of our orders are covered by [address] but there ARE SOME CHARGES for items such as radiology interpretations and anesthesiology during procedures and surgeries. Please make sure you have contacted the Advanced Patient Advocacy (APA) group to check on your payment options: [email or web address]. or come in and talk to them in person: On  Tuesdays, we have Advanced Patient Advocacy is available Mon - Fri 8-4pm at [facility] on the first floor by the information desk. Their number is [phone]. It is important that you are screened in order to qualify for assistance and to avoid huge medical charges. The [facility] is not responsible for ANY charges you may accrue regardless of who ordered the medication, procedure, treatment or test. If you go to the Emergency Room, you WILL be charged! Behavior and emotional issues! It is stressful to be sick, have an illness, take medications, not have a job, not have medical insurance, have family issues or just getting older! Schedule an appointment with our mental health provider. She is in the office Mondays and Wednesdays from 8am to [address] [facility] can also contact the following: The national suicide hotline (8-225-270-MGTK or 6-426.309.5368)    [address]  [phone]    [facility]) - [address], [facility]   [phone]            Immunizations/Vaccination  Routine Immunizations are provided for infants, children, teens, and adults at the [facility]. Please bring all immunization records with you and present them at the time of registration. Phone [phone] extension [phone]  Hours (Walk in)  Monday, Tuesday, Wednesday and Friday  8:00 AM to 11:00 AM  12:30 PM to 3:00 PM      Drug and Alcohol Addiction Issues!  It is hard to stop a poor habit but there is help out there. Please feel free to attend any other the following support groups to help you kick the habit or go to [facility] to be evaluated by the psychiatric team. Never give up!! Stefania meetings: Sang lew [facility], [place]    [facility] MONDAY 7:30 PM JUST FOR TODAY [employer] [facility] [address] [facility] WEDNESDAY 10:30 AM [employer] [place] [employer], ROOM [address] [place] [address]     [facility] WEDNESDAY 8:00  Joni Coulter[address] 8:00 PM KEEP IT SIMPLE [employer] [facility] 1 [address]     [place] THURSDAY 8:00 PM LET IT BEGIN WITH ME [employer] [facility] 6;30 PM [place] PARENTS [employer] [place] [address] [facility] [address] [facility] [address]     [place] MONDAY 10:30 AM [place] [employer] [facility] [address] [facility] SATURDAY 8:00 PM [facility] SATURDAY NIGHT [employer] Mary [facility] 96 [facility] MONDAY 7:00 PM MONDAY NIGHT [employer] [place] [facility] [address]     [place] WEDNESDAY 8:00 PM [employer] [facility] [address]  [facility]

## 2017-03-22 ENCOUNTER — TELEPHONE (OUTPATIENT)
Dept: FAMILY MEDICINE CLINIC | Age: 62
End: 2017-03-22

## 2017-03-28 NOTE — TELEPHONE ENCOUNTER
Medication: Livalo 2mg , dose: tab, how often: qpm , current number of medication days provided: 90, refill per application. Lot #: [id number], EXP 03/2018. This medication was received and verified for the following 1. Correct Patient, 2. Correct Diagnosis, 3. Correct Drug, 4. Correct route, and no current allergy to medication. Please contact patient to come  their medications. Lenka Hanna MSN,RN,[facility] needs to finish crestor first then start livalo.  Thank you

## 2017-05-22 ENCOUNTER — HOSPITAL ENCOUNTER (OUTPATIENT)
Dept: LAB | Age: 62
Discharge: HOME | End: 2017-05-22

## 2017-05-22 ENCOUNTER — LAB ONLY (OUTPATIENT)
Dept: FAMILY MEDICINE CLINIC | Age: 62
End: 2017-05-22

## 2017-05-22 DIAGNOSIS — E11.8 CONTROLLED DIABETES MELLITUS TYPE 2 WITH COMPLICATIONS, UNSPECIFIED LONG TERM INSULIN USE STATUS: Primary | ICD-10-CM

## 2017-05-22 DIAGNOSIS — D64.9 ANEMIA, UNSPECIFIED TYPE: ICD-10-CM

## 2017-05-22 DIAGNOSIS — E11.8 TYPE 2 DIABETES MELLITUS WITH COMPLICATION, WITHOUT LONG-TERM CURRENT USE OF INSULIN (HCC): ICD-10-CM

## 2017-05-22 DIAGNOSIS — E78.5 HYPERLIPIDEMIA WITH TARGET LDL LESS THAN 100: ICD-10-CM

## 2017-05-22 LAB
ALBUMIN SERPL BCP-MCNC: 3.6 G/DL (ref 3.4–5)
ALBUMIN/GLOB SERPL: 1 {RATIO} (ref 0.8–1.7)
ALP SERPL-CCNC: 63 U/L (ref 45–117)
ALT SERPL-CCNC: 25 U/L (ref 13–56)
ANION GAP BLD CALC-SCNC: 8 MMOL/L (ref 3–18)
AST SERPL W P-5'-P-CCNC: 21 U/L (ref 15–37)
BASOPHILS # BLD AUTO: 0 K/UL (ref 0–0.06)
BASOPHILS # BLD: 0 % (ref 0–2)
BILIRUB SERPL-MCNC: 0.4 MG/DL (ref 0.2–1)
BUN SERPL-MCNC: 26 MG/DL (ref 7–18)
BUN/CREAT SERPL: 25 (ref 12–20)
CALCIUM SERPL-MCNC: 8.9 MG/DL (ref 8.5–10.1)
CHLORIDE SERPL-SCNC: 105 MMOL/L (ref 100–108)
CHOLEST SERPL-MCNC: 134 MG/DL
CO2 SERPL-SCNC: 29 MMOL/L (ref 21–32)
CREAT SERPL-MCNC: 1.05 MG/DL (ref 0.6–1.3)
CREAT UR-MCNC: 65.3 MG/DL (ref 30–125)
DIFFERENTIAL METHOD BLD: ABNORMAL
EOSINOPHIL # BLD: 0.2 K/UL (ref 0–0.4)
EOSINOPHIL NFR BLD: 3 % (ref 0–5)
ERYTHROCYTE [DISTWIDTH] IN BLOOD BY AUTOMATED COUNT: 13.2 % (ref 11.6–14.5)
EST. AVERAGE GLUCOSE BLD GHB EST-MCNC: 166 MG/DL
GLOBULIN SER CALC-MCNC: 3.6 G/DL (ref 2–4)
GLUCOSE SERPL-MCNC: 87 MG/DL (ref 74–99)
HBA1C MFR BLD: 7.4 % (ref 4.2–5.6)
HCT VFR BLD AUTO: 31.4 % (ref 35–45)
HDLC SERPL-MCNC: 72 MG/DL (ref 40–60)
HDLC SERPL: 1.9 {RATIO} (ref 0–5)
HGB BLD-MCNC: 9.8 G/DL (ref 12–16)
LDLC SERPL CALC-MCNC: 50 MG/DL (ref 0–100)
LIPID PROFILE,FLP: ABNORMAL
LYMPHOCYTES # BLD AUTO: 32 % (ref 21–52)
LYMPHOCYTES # BLD: 2.5 K/UL (ref 0.9–3.6)
MCH RBC QN AUTO: 27.1 PG (ref 24–34)
MCHC RBC AUTO-ENTMCNC: 31.2 G/DL (ref 31–37)
MCV RBC AUTO: 87 FL (ref 74–97)
MICROALBUMIN UR-MCNC: 0.55 MG/DL (ref 0–3)
MICROALBUMIN/CREAT UR-RTO: 8 MG/G (ref 0–30)
MONOCYTES # BLD: 0.7 K/UL (ref 0.05–1.2)
MONOCYTES NFR BLD AUTO: 9 % (ref 3–10)
NEUTS SEG # BLD: 4.3 K/UL (ref 1.8–8)
NEUTS SEG NFR BLD AUTO: 56 % (ref 40–73)
PLATELET # BLD AUTO: 278 K/UL (ref 135–420)
PMV BLD AUTO: 10.2 FL (ref 9.2–11.8)
POTASSIUM SERPL-SCNC: 3.9 MMOL/L (ref 3.5–5.5)
PROT SERPL-MCNC: 7.2 G/DL (ref 6.4–8.2)
RBC # BLD AUTO: 3.61 M/UL (ref 4.2–5.3)
SODIUM SERPL-SCNC: 142 MMOL/L (ref 136–145)
TRIGL SERPL-MCNC: 60 MG/DL (ref ?–150)
VLDLC SERPL CALC-MCNC: 12 MG/DL
WBC # BLD AUTO: 7.8 K/UL (ref 4.6–13.2)

## 2017-05-22 PROCEDURE — 82043 UR ALBUMIN QUANTITATIVE: CPT | Performed by: NURSE PRACTITIONER

## 2017-05-22 PROCEDURE — 80061 LIPID PANEL: CPT | Performed by: NURSE PRACTITIONER

## 2017-05-22 PROCEDURE — 85025 COMPLETE CBC W/AUTO DIFF WBC: CPT | Performed by: NURSE PRACTITIONER

## 2017-05-22 PROCEDURE — 80053 COMPREHEN METABOLIC PANEL: CPT | Performed by: NURSE PRACTITIONER

## 2017-05-22 PROCEDURE — 83036 HEMOGLOBIN GLYCOSYLATED A1C: CPT | Performed by: NURSE PRACTITIONER

## 2017-05-22 NOTE — PROGRESS NOTES
Will review results with pt at 6/1/17 appt  1. A1C decreased from 8.1 to 7.4   2. BUN 26 on HCTZ. Assess hydration   3. LDL 50   4.  Anemia H/H 9.8/31.4 respectively

## 2017-05-22 NOTE — PROGRESS NOTES
Patient name, date of birth and orders verified before specimen collection. Rt  arm is dry and intact. 21g butterfly  was utilized to collect specimen. Pt tolerated procedure well.

## 2017-06-01 ENCOUNTER — OFFICE VISIT (OUTPATIENT)
Dept: FAMILY MEDICINE CLINIC | Age: 62
End: 2017-06-01

## 2017-06-01 VITALS
SYSTOLIC BLOOD PRESSURE: 110 MMHG | BODY MASS INDEX: 35.68 KG/M2 | OXYGEN SATURATION: 99 % | HEART RATE: 100 BPM | WEIGHT: 201.4 LBS | DIASTOLIC BLOOD PRESSURE: 74 MMHG | HEIGHT: 63 IN | RESPIRATION RATE: 20 BRPM | TEMPERATURE: 98.5 F

## 2017-06-01 DIAGNOSIS — R79.9 ELEVATED BUN: ICD-10-CM

## 2017-06-01 DIAGNOSIS — E66.9 OBESITY (BMI 30-39.9): ICD-10-CM

## 2017-06-01 DIAGNOSIS — M25.561 CHRONIC PAIN OF RIGHT KNEE: ICD-10-CM

## 2017-06-01 DIAGNOSIS — I15.0 RENOVASCULAR HYPERTENSION: ICD-10-CM

## 2017-06-01 DIAGNOSIS — E78.5 HYPERLIPIDEMIA WITH TARGET LDL LESS THAN 100: ICD-10-CM

## 2017-06-01 DIAGNOSIS — E83.42 HYPOMAGNESEMIA: ICD-10-CM

## 2017-06-01 DIAGNOSIS — E11.8 TYPE 2 DIABETES MELLITUS WITH COMPLICATION, WITHOUT LONG-TERM CURRENT USE OF INSULIN (HCC): ICD-10-CM

## 2017-06-01 DIAGNOSIS — Z78.0 POSTMENOPAUSAL: ICD-10-CM

## 2017-06-01 DIAGNOSIS — Z12.39 BREAST CANCER SCREENING: Primary | ICD-10-CM

## 2017-06-01 DIAGNOSIS — G89.29 CHRONIC PAIN OF RIGHT KNEE: ICD-10-CM

## 2017-06-01 DIAGNOSIS — D50.9 IRON DEFICIENCY ANEMIA, UNSPECIFIED IRON DEFICIENCY ANEMIA TYPE: ICD-10-CM

## 2017-06-01 DIAGNOSIS — Z00.00 ROUTINE HEALTH MAINTENANCE: ICD-10-CM

## 2017-06-01 RX ORDER — DEXTROMETHORPHAN HYDROBROMIDE, GUAIFENESIN 5; 100 MG/5ML; MG/5ML
650 LIQUID ORAL
COMMUNITY
Start: 2017-06-01

## 2017-06-01 RX ORDER — LISINOPRIL AND HYDROCHLOROTHIAZIDE 12.5; 2 MG/1; MG/1
1 TABLET ORAL DAILY
Qty: 30 TAB | Refills: 3 | Status: SHIPPED | OUTPATIENT
Start: 2017-06-01 | End: 2017-09-07 | Stop reason: ALTCHOICE

## 2017-06-01 RX ORDER — GLYBURIDE 1.25 MG/1
1.25 TABLET ORAL
COMMUNITY
End: 2017-06-01

## 2017-06-01 RX ORDER — METFORMIN HYDROCHLORIDE 1000 MG/1
1000 TABLET ORAL 2 TIMES DAILY WITH MEALS
Qty: 60 TAB | Refills: 3 | Status: SHIPPED | OUTPATIENT
Start: 2017-06-01 | End: 2017-09-07 | Stop reason: SDUPTHER

## 2017-06-01 RX ORDER — GLIPIZIDE 10 MG/1
10 TABLET ORAL 2 TIMES DAILY
Qty: 60 TAB | Refills: 3 | Status: SHIPPED | OUTPATIENT
Start: 2017-06-01 | End: 2017-09-07 | Stop reason: SDUPTHER

## 2017-06-01 RX ORDER — LANOLIN ALCOHOL/MO/W.PET/CERES
400 CREAM (GRAM) TOPICAL DAILY
Qty: 30 TAB | Refills: 11 | Status: SHIPPED | OUTPATIENT
Start: 2017-06-01 | End: 2018-10-08 | Stop reason: SDUPTHER

## 2017-06-01 NOTE — LETTER
6/1/2017 [facility] [address] [facility], [address] Claudia Hines, 1955, is picking up the following medications ordered from the [facility]: LIVALO 2 MG #90 Jaime Diaz Patient's Signature: _____________________________ Today's Date: 6/1/2017

## 2017-06-01 NOTE — MR AVS SNAPSHOT
Visit Information Date & Time Provider Department Dept. Phone Encounter #  
 6/1/2017 10:00 AM Kenzie Moraes NP [address] [facility] [address] [id number] Follow-up Instructions Return in about 3 months (around 9/1/2017), or if symptoms worsen or fail to improve. Upcoming Health Maintenance Date Due DTaP/Tdap/Td series (1 - Tdap) 10/16/1976 BREAST CANCER SCRN MAMMOGRAM 10/16/2005 FOBT Q 1 YEAR AGE 50-75 10/16/2005 ZOSTER VACCINE AGE 60> 10/16/2015 PAP AKA CERVICAL CYTOLOGY 7/9/2017 Pneumococcal 19-64 Medium Risk (1 of 1 - PPSV23) 3/2/2018* EYE EXAM RETINAL OR DILATED Q1 6/1/2018* INFLUENZA AGE 9 TO ADULT 8/1/2017 HEMOGLOBIN A1C Q6M 11/22/2017 FOOT EXAM Q1 3/2/2018 MICROALBUMIN Q1 5/22/2018 LIPID PANEL Q1 5/22/2018 *Topic was postponed. The date shown is not the original due date. Allergies as of 6/1/2017  Review Complete On: 6/1/2017 By: Kenzie Moraes NP Severity Noted Reaction Type Reactions Codeine  11/28/2012    Nausea and Vomiting Januvia [Sitagliptin]  07/09/2014   Side Effect Nausea Only, Myalgia Pcn [Penicillins]  11/28/2012    Swelling Current Immunizations  Reviewed on 3/2/2017 Name Date Influenza Vaccine (Quad) PF 3/2/2017 Influenza Vaccine Split 11/28/2012 Not reviewed this visit You Were Diagnosed With   
  
 Codes Comments Breast cancer screening    -  Primary ICD-10-CM: Z12.39 
ICD-9-CM: V76.10 Routine health maintenance     ICD-10-CM: Z00.00 ICD-9-CM: V70.0 Renovascular hypertension     ICD-10-CM: I15.0 ICD-9-CM: 405.91 Hypomagnesemia     ICD-10-CM: O33.48 
ICD-9-CM: 275.2 Type 2 diabetes mellitus with complication, without long-term current use of insulin (HCC)     ICD-10-CM: E11.8 ICD-9-CM: 250.90 Chronic pain of right knee     ICD-10-CM: M25.561, G89.29 ICD-9-CM: 719.46, 338.29   
 Elevated BUN     ICD-10-CM: R79.9 ICD-9-CM: 790.6 Obesity (BMI 30-39. 9)     ICD-10-CM: E66.9 ICD-9-CM: 278.00 Postmenopausal     ICD-10-CM: Z78.0 ICD-9-CM: V49.81 Vitals BP Pulse Temp Resp Height(growth percentile) Weight(growth percentile) 110/74 100 98.5 °F (36.9 °C) 20 5' 3\" (1.6 m) 201 lb 6.4 oz (91.4 kg) SpO2 BMI OB Status Smoking Status 99% 35.68 kg/m2 Postmenopausal Never Smoker Vitals History BMI and BSA Data Body Mass Index Body Surface Area  
 35.68 kg/m 2 2.02 m 2 Preferred Pharmacy Pharm[facility] Phone [facility] [address] [phone] Your Updated Medication List  
  
   
This list is accurate as of: 6/1/17 10:26 AM.  Always use your most recent med list.  
  
  
  
  
 Calcium Citrate-Vitamin D3 500 mg calcium -400 unit Chew Take 1 Tab by mouth two (2) times a day. Indications: VITAMIN D DEFICIENCY  
  
 cyclobenzaprine 10 mg tablet Commonly known as:  FLEXERIL Take 1 Tab by mouth nightly as needed for Muscle Spasm(s). Indications: MUSCLE SPASM  
  
 ferrous sulfate 325 mg (65 mg iron) tablet Take 1 Tab by mouth Daily (before breakfast). Indications: IRON DEFICIENCY ANEMIA  
  
 glipiZIDE 10 mg tablet Commonly known as:  Kb Argue Take 1 Tab by mouth two (2) times a day. lisinopril-hydroCHLOROthiazide 20-12.5 mg per tablet Commonly known as:  Sunil Haris Take 1 Tab by mouth daily. Indications: hypertension  
  
 magnesium oxide 400 mg tablet Commonly known as:  MAG-OX Take 1 Tab by mouth daily. metFORMIN 1,000 mg tablet Commonly known as:  GLUCOPHAGE Take 1 Tab by mouth two (2) times daily (with meals). naproxen 500 mg tablet Commonly known as:  NAPROSYN Take 1 Tab by mouth two (2) times daily as needed. Indications: PAIN  
  
 OTHER Alphabetic vitamins take as directed  
  
 pitavastatin 2 mg tablet Commonly known as:  LIVALO Take 1 Tab by mouth daily. TYLENOL ARTHRITIS PAIN 650 mg CR tablet Generic drug:  acetaminophen Take 1 Tab by mouth every eight (8) hours as needed for Pain. Indications: Arthritic Pain Prescriptions Sent to Pharmacy Refills  
 lisinopril-hydroCHLOROthiazide (PRINZIDE, ZESTORETIC) 20-12.5 mg per tablet 3 Sig: Take 1 Tab by mouth daily. Indications: hypertension Class: Normal  
 Pharmacy: [place] [id number]. Ph #: [phone] Route: Oral  
 magnesium oxide (MAG-OX) 400 mg tablet 11 Sig: Take 1 Tab by mouth daily. Class: Normal  
 Pharmacy: [place] [address]. Ph #: [phone] Route: Oral  
 glipiZIDE (GLUCOTROL) 10 mg tablet 3 Sig: Take 1 Tab by mouth two (2) times a day. Class: Normal  
 Pharmacy: [place] [address]. Ph #: [phone] Route: Oral  
 metFORMIN (GLUCOPHAGE) 1,000 mg tablet 3 Sig: Take 1 Tab by mouth two (2) times daily (with meals). Class: Normal  
 Pharmacy: [place] [address]. Ph #: [phone] Route: Oral  
  
Follow-up Instructions Return in about 3 months (around 9/1/2017), or if symptoms worsen or fail to improve. To-Do List   
 06/01/2017 Imaging:  XR KNEE RT MIN 4 V Patient Instructions [facility] reminders! Foundation Operating Hours: These may change without notice. Mon- Wed 7am to 5pm. Closed for lunch 12-1pm 
Thurs 7am to 12pm 
Fridays closed NO SHOW POLICY ~ If a patient has 3 no shows for an appointment with the Provider, Mental Health Provider, or the Nurse Navigator in 6 months, they will be discharged from the practice for 6 months. Medication ordering will also be suspended.  If the patient is discharged from the [place], they can go to the [place] [address] where they can be seen for the primary needs plus obtain the same types of medications as they receive at the [place]. To avoid being discharged, the patient must call the office at [phone] 24 hours prior to their appointment if they need to cancel, arrive to their appointments on time and come to all scheduled appointments. If the patient is discharged from the practice, they can apply to be re-established after 12 months. Lab work:  Unless you are instructed differently, please return to the office between the hours of 7 am and 10:30 am Monday through Thursday to have your labs drawn one week before your next scheduled PROVIDER visit. If you do not have an appointment to follow up on these results, please make one or plan to call the office if you do not hear from us to get the results. No news does not mean good news. Medications: If your medications are new or have changed, and you get your medications from the clinic pharmacy ([facility]), you MUST talk to the pharmacy staff to sign the new prescription applications. If you don't sign the applications we cannot get the medications for you. It usually takes 6-8 weeks for your medications to arrive. The Pharmacy staff will call you when your medications are available. You will have 30 days to come in and  your medications. If you don't  your medicines within those 30 days, those medicines will be placed on the self as samples and you will have to start all over again by completing the applications and waiting the 6-8 weeks for your medicines to arrive. This is firm and there will be no exceptions! ! The [facility] or [facility] will assist you with your medications if available but not all medications are available so some may be obtained through local pharmacies such as [facility] that has a large $4 list and [facility] [place] that has many drugs for free or also for $4.  We will work hard to get the best medications for you that you can also afford. [facility] Medication pick-up times: 
Monday-Tuesday 1:00 -5:00 PM 
Wednesday- Thursday 8:00 -11:30 AM 
 
 
Feet Care: Local [facility] through [facility] Every second Tuesday of the month (except for holidays and election days) from 9am to 1 pm. The services provided by these ministry volunteers are free of charge with the option to donate. They will inspect your feet thoroughly, soak them for 10 minutes, cut and file your nails. They care for diabetics as well. Keep in mind this service is free and will be on a first come first serve basis. Bad teeth? Ask about the Dental Bus to get you in front of a local dentist. The bus leaves every other Wednesday for those on the list. (Ask about availability as these appointments are limited) Eye exams for Diabetics. Please let us know so we can add you to the list to see the eye doctor at [facility]. You will receive a free eye exam and free glasses if needed. Unfortunately, if you are not a diabetic, we do not have a free service for eye exams for you (yet!). We do have information on where to go to get a huge discount on eye exams and glasses. Sick visits: If you are sick and it is not an emergency call the office to see if you can schedule an appointment. Charges and cost items from the clinic:  Most of our orders are covered by [address] but there ARE SOME CHARGES for items such as radiology interpretations and anesthesiology during procedures and surgeries. Please make sure you have contacted the Advanced Patient Advocacy (APA) group to check on your payment options: [email or web address]. or come in and talk to them in person: On 
Tuesdays, we have Advanced Patient Advocacy is available Mon - Fri 8-4pm at [facility] on the first floor by the information desk. Their number is [phone]. It is important that you are screened in order to qualify for assistance and to avoid huge medical charges.  The Foundation is not responsible for ANY charges you may accrue regardless of who ordered the medication, procedure, treatment or test. If you go to the Emergency Room, you WILL be charged! Behavior and emotional issues! It is stressful to be sick, have an illness, take medications, not have a job, not have medical insurance, have family issues or just getting older! Schedule an appointment with our mental health provider. She is in the office Mondays and Wednesdays from 8am to [place] [address] can also contact the following: The national suicide hotline (6-096-327-ERSG or 9-199.303.2346) [facility] [address] [facility], [address] [facility] [address]- in hours Monday -Wednesday 8:30 am -11:15 AM 
2:15pm -4:15 pm 
 
[employer] [facility] [place] [address] [facility], [address] [facility]  
[phone] The [address] [place] [address] Aging and The [facility], in collaboration with community partners, provides and advocates for resources and services to improve the employment, quality of life, security, and independence of older [address], [address] with disabilities, and their families. Department for Aging and Rehabilitative Services Street Location: [address] [facility] [place] Voice: [phone] Toll Free Number:[phone] Toll Free TTY: [phone] E-mail: [email or web address] Immunizations/Vaccination Routine Immunizations are provided for infants, children, teens, and adults at the [facility]. Please bring all immunization records with you and present them at the time of registration. Phone [phone] Hours (Walk in) Monday, Tuesday, Wednesday and Friday 8:00 AM to 11:00 AM 
12:30 PM to 3:00 PM 
 
 
Drug and Alcohol Addiction Issues! It is hard to stop a poor habit but there is help out there.  Please feel free to attend any other the following support groups to help you kick the habit or go to [facility] Emergency Department to be evaluated by the psychiatric team. Never give up!! Stefania meetings: [facility] [place] [facility] MONDAY 7:30 PM JUST FOR TODAY [employer] [facility] [address] [facility] WEDNESDAY 10:30 AM [place] [facility], ROOM [address] [place] [address]  
 
[place] WEDNESDAY 8:00  Joni Brewster [facility] [address] [facility] THURSDAY 8:00 PM KEEP IT SIMPLE [employer] [facility] [address] 8:00 PM LET IT BEGIN WITH ME [employer] [facility] [facility] [address] [facility] FRIDAY 6;30 PM [place] PARENTS [employer] [facility] [address] [facility] [address] [place] MONDAY 10:30  [place] [address] [place] SATURDAY 8:00 PM [place] [facility] SATURDAY NIGHT [employer] [place] [address] [place] MONDAY 7:00 PM MONDAY NIGHT [employer] [facility] [address]  
 
[place] WEDNESDAY 8:00 PM SERENITY SEEKERS [employer] [facility] [address]  
 
  
Knee Pain or Injury: Care Instructions Your Care Instructions Injuries are a common cause of knee problems. Sudden (acute) injuries may be caused by a direct blow to the knee. They can also be caused by abnormal twisting, bending, or falling on the knee. Pain, bruising, or swelling may be severe, and may start within minutes of the injury. Overuse is another cause of knee pain. Other causes are climbing stairs, kneeling, and other activities that use the knee. Everyday wear and tear, especially as you get older, also can cause knee pain.  
Rest, along with home treatment, often relieves pain and allows your knee to heal. If you have a serious knee injury, you may need tests and treatment. Follow-up care is a key part of your treatment and safety. Be sure to make and go to all appointments, and call your doctor if you are having problems. It's also a good idea to know your test results and keep a list of the medicines you take. How can you care for yourself at home? · Be safe with medicines. Read and follow all instructions on the label. ¨ If the doctor gave you a prescription medicine for pain, take it as prescribed. ¨ If you are not taking a prescription pain medicine, ask your doctor if you can take an over-the-counter medicine. · Rest and protect your knee. Take a break from any activity that may cause pain. · Put ice or a cold pack on your knee for 10 to 20 minutes at a time. Put a thin cloth between the ice and your skin. · Prop up a sore knee on a pillow when you ice it or anytime you sit or lie down for the next 3 days. Try to keep it above the level of your heart. This will help reduce swelling. · If your knee is not swollen, you can put moist heat, a heating pad, or a warm cloth on your knee. · If your doctor recommends an elastic bandage, sleeve, or other type of support for your knee, wear it as directed. · Follow your doctor's instructions about how much weight you can put on your leg. Use a cane, crutches, or a walker as instructed. · Follow your doctor's instructions about activity during your healing process. If you can do mild exercise, slowly increase your activity. · Reach and stay at a healthy weight. Extra weight can strain the joints, especially the knees and hips, and make the pain worse. Losing even a few pounds may help. When should you call for help? Call 911 anytime you think you may need emergency care. For example, call if: 
· You have symptoms of a blood clot in your lung (called a pulmonary embolism). These may include: 
¨ Sudden chest pain. ¨ Trouble breathing. ¨ Coughing up blood. Call your doctor now or seek immediate medical care if: 
· You have severe or increasing pain. · Your leg or foot turns cold or changes color. · You cannot stand or put weight on your knee. · Your knee looks twisted or bent out of shape. · You cannot move your knee. · You have signs of infection, such as: 
¨ Increased pain, swelling, warmth, or redness. ¨ Red streaks leading from the knee. ¨ Pus draining from a place on your knee. ¨ A fever. · You have signs of a blood clot in your leg (called a deep vein thrombosis), such as: 
¨ Pain in your calf, back of the knee, thigh, or groin. ¨ Redness and swelling in your leg or groin. Watch closely for changes in your health, and be sure to contact your doctor if: 
· You have tingling, weakness, or numbness in your knee. · You have any new symptoms, such as swelling. · You have bruises from a knee injury that last longer than 2 weeks. · You do not get better as expected. Where can you learn more? Go to http://dixon-edilson.info/. Enter K195 in the search box to learn more about \"Knee Pain or Injury: Care Instructions. \" Current as of: May 27, 2016 Content Version: 11.2 © 2674-4120 [employer]. Care instructions adapted under license by [employer] (which disclaims liability or warranty for this information). If you have questions about a medical condition or this instruction, always ask your healthcare professional. [place] [address] any warranty or liability for your use of this information. Introducing [facility]! [facility] introduces [employer] patient portal. Now you can access parts of your medical record, email your doctor's office, and request medication refills online. 1. In your internet browser, go to [email or web address] 2. Click on the First Time User? Click Here link in the Sign In box. You will see the New Member Sign Up page. 3. Enter your [employer] Access Code exactly as it appears below. You will not need to use this code after youve completed the sign-up process. If you do not sign up before the expiration date, you must request a new code. · [employer] Access Code: [email or web address] Expires: 8/30/2017  9:53 AM 
 
4. Enter the last four digits of your Social Security Number (xxxx) and Date of Birth (mm/dd/yyyy) as indicated and click Submit. You will be taken to the next sign-up page. 5. Create a [employer] ID. This will be your [employer] login ID and cannot be changed, so think of one that is secure and easy to remember. 6. Create a [employer] password. You can change your password at any time. 7. Enter your Password Reset Question and Answer. This can be used at a later time if you forget your password. 8. Enter your e-mail address. You will receive e-mail notification when new information is available in [address] 9. Click Sign Up. You can now view and download portions of your medical record. 10. Click the Download Summary menu link to download a portable copy of your medical information. If you have questions, please visit the Frequently Asked Questions section of the [employer] website. Remember, [employer] is NOT to be used for urgent needs. For medical emergencies, dial 911. Now available from your iPhone and Android! Please provide this summary of care documentation to your next provider. Your primary care clinician is listed as Michelle Ross. If you have any questions after today's visit, please call [phone].

## 2017-06-01 NOTE — PROGRESS NOTES
[address]  [id number] [facility] [address] [facility] [address], [address]  [phone] office/[phone] fax      6/1/2017    Reason for visit:   Chief Complaint   Patient presents with    Results    Follow-up    Diabetes     Improving diet,  walking, had eye exam within last year Dr. Liyah Lira Anemia       Patient: Robyn Simon, 1955, [id number]       Primary MD: Lorin Oakes NP    Subjective:   Robyn Simon, a 64 y.o. female, who presents for Results; Follow-up; Diabetes (Improving diet,  walking, had eye exam within last year Dr. Roxane Jimenez); and Anemia      HPI   Diabetes/HLD/HTN  BP today is at goal today <140/90. Patients risk factors include: No insurance and low income   Home Blood Sugars are in the following range:  Unknown   . Denies hypoglycemic episodes  Recent hospitalizations: None recent    Medications regimen is as follows: Glucotrol and metformin   Last HgbA1C: 7.4   Daily exercise and diet are as follows: Cut out sodas and has been working on improving diet. Walks daily at work   Weight stable  Takes the below meds regularly with no side effects. Patient is on a statin denies myalgia  Last LDL: 50  Last DM eye exam: Within the last year per patient   Last DM foot exam: 3/2/17  Last urine microalbumin: 5/22/17    Anemia  Patient presents for presents evaluation of anemia. Anemia was found by chronic. It has been present for several years. Associated signs & symptoms: none.         Past Medical History:   Diagnosis Date    Anemia     Arthritis     Back pain     Diabetes ([address]     Diabetes mellitus type 2, controlled ([address] 6/30/2016    High cholesterol     Hypertension     Right arm weakness 3/3/15    pending EMG 4/22/15    Right knee DJD 1980    Cortisone shots/Mobic/ original injury    Right sciatic nerve pain 3/3/15    on Dr. 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 90 Tab 0    ferrous sulfate 325 mg (65 mg iron) tablet Take 1 Tab by mouth Daily (before breakfast). Indications: IRON DEFICIENCY ANEMIA 30 Tab 11    cyclobenzaprine (FLEXERIL) 10 mg tablet Take 1 Tab by mouth nightly as needed for Muscle Spasm(s). Indications: MUSCLE SPASM 30 Tab 3    OTHER Alphabetic vitamins take as directed      naproxen (NAPROSYN) 500 mg tablet Take 1 Tab by mouth two (2) times daily as needed. Indications: PAIN 60 Tab 3     No current facility-administered medications on file prior to visit. Review of Systems   Constitutional: Negative. Negative for fever and malaise/fatigue. HENT: Negative. Eyes: Negative. Respiratory: Negative. Negative for shortness of breath. Cardiovascular: Negative. Negative for chest pain, palpitations and leg swelling. Gastrointestinal: Negative. Genitourinary: Negative. Musculoskeletal: Positive for joint pain (RIght knee ). Skin: Negative. Neurological: Negative. Negative for headaches. Psychiatric/Behavioral: Negative.         Objective:   Visit Vitals    /74    Pulse 100    Temp 98.5 °F (36.9 °C)    Resp 20    Ht 5' 3\" (1.6 m)    Wt 201 lb 6.4 oz (91.4 kg)    SpO2 99%    BMI 35.68 kg/m2      Wt Readings from Last 3 Encounters:   06/01/17 201 lb 6.4 oz (91.4 kg)   03/02/17 203 lb 12.8 oz (92.4 kg)   03/22/16 199 lb 9.6 oz (90.5 kg)     Lab Results   Component Value Date/Time    Glucose 87 05/22/2017 07:43 AM    Glucose  03/03/2015 09:34 AM         Physical Exam   Constitutional: She is oriented to person, place, and time. She appears well-developed and well-nourished. HENT:   Head: Normocephalic. Eyes: Pupils are equal, round, and reactive to light. Neck: Normal range of motion. Neck supple. No JVD present. Carotid bruit is not present. Cardiovascular: Normal rate, regular rhythm, normal heart sounds and intact distal pulses. No murmur heard. Pulmonary/Chest: Effort normal and breath sounds normal. No respiratory distress. Abdominal: Soft. Bowel sounds are normal.   Musculoskeletal: Normal range of motion. Right knee: She exhibits normal range of motion, no swelling, no LCL laxity, normal patellar mobility and no MCL laxity. Tenderness found. Medial joint line tenderness noted. Neurological: She is alert and oriented to person, place, and time. She has normal reflexes. Gait abnormal.   Walks with limp due to knee pain    Skin: Skin is warm and dry. Psychiatric: She has a normal mood and affect. Her behavior is normal.   Vitals reviewed. Assessment:    Kathie Savage who has risk factors including (see above previous medical hx) and:       ICD-10-CM ICD-9-CM    1. Breast cancer screening Z12.39 V76.10    2. Routine health maintenance Z00.00 V70.0    3. Renovascular hypertension I15.0 405.91 lisinopril-hydroCHLOROthiazide (PRINZIDE, ZESTORETIC) 20-12.5 mg per tablet   4. Hypomagnesemia E83.42 275.2 magnesium oxide (MAG-OX) 400 mg tablet   5.  Type 2 diabetes mellitus with complication, without long-term current use of insulin (HCC) E11.8 250.90 glipiZIDE (GLUCOTROL) 10 mg tablet      metFORMIN (GLUCOPHAGE) 1,000 mg tablet   6. Chronic pain of right knee M25.561 719.46 XR KNEE RT MIN 4 V    G89.29 338.29 acetaminophen (TYLENOL ARTHRITIS PAIN) 650 mg CR tablet      Calcium Citrate-Vitamin D3 500 mg calcium -400 unit chew   7. Elevated BUN R79.9 790.6    8. Obesity (BMI 30-39. 9) E66.9 278.00    9. Postmenopausal Z78.0 V49.81 Calcium Citrate-Vitamin D3 500 mg calcium -400 unit chew     1. Breast cancer screening  -The patient to schedule mammogram with [facility]     2. Routine health maintenance  -Patient notified to schedule pap/pelvic exam 1 week prior to follow up visit along with labs. 3. Renovascular hypertension  - lisinopril-hydroCHLOROthiazide (PRINZIDE, ZESTORETIC) 20-12.5 mg per tablet; Take 1 Tab by mouth daily. Indications: hypertension  Dispense: 30 Tab; Refill: 3    4. Hypomagnesemia  - magnesium oxide (MAG-OX) 400 mg tablet; Take 1 Tab by mouth daily. Dispense: 30 Tab; Refill: 11    5. Type 2 diabetes mellitus with complication, without long-term current use of insulin (HCC)  -The current medical regimen is effective;  continue present plan and medications. - glipiZIDE (GLUCOTROL) 10 mg tablet; Take 1 Tab by mouth two (2) times a day. Dispense: 60 Tab; Refill: 3  - metFORMIN (GLUCOPHAGE) 1,000 mg tablet; Take 1 Tab by mouth two (2) times daily (with meals). Dispense: 60 Tab; Refill: 3    6. Chronic pain of right knee  -Will get x ray to evaluate  - XR KNEE RT MIN 4 V; Future  - acetaminophen (TYLENOL ARTHRITIS PAIN) 650 mg CR tablet; Take 1 Tab by mouth every eight (8) hours as needed for Pain. Indications: Arthritic Pain  - Calcium Citrate-Vitamin D3 500 mg calcium -400 unit chew; Take 1 Tab by mouth two (2) times a day. Indications: VITAMIN D DEFICIENCY  Dispense: 60 Tab; Refill: 11    7. Elevated BUN  -Will decrease HCTZ dose in [place].   -Encouraged the patient to increase Fluid intake to include 6-8 * oz glasses of water a day.      8. Obesity (BMI 30-39.9)  -Weight management: Discussed importance of maintaining a normal weight through healthy dietary changes and aerobic exercise on a daily basis of 30 min or more to decrease need for additional medications, reduction of blood glucose/blood pressure/ dementia/osteoporosis/stress and depression. Aerobic exercise was described as an activity that makes them sweaty and elevates their heart rate such as walking, running, bike, treadmill, stair climbing, joining a gym or swimming. Discussed the patient's above normal BMI with her. Recommended the following interventions: dietary management education, guidance, counseling, exercise and monitoring weight. BMI is out of normal parameters and plan is as follows: Discussed in great detail on diet, portion control, exercise, avoiding foods high in sugar, carbs and starches, mealtimes and to eat until satisfied not til full. I have counseled this patient on diet and exercise regimens      9. Postmenopausal  -She has been identified to be post menopausal either by age or cessation of her ovarian function which places her at a higher risk for osteoporosis, vaginal dryness and increases her cardiovascular risk. Calcium will be requested at bid dosing to aid with absorption, a pelvic exam has been requested to monitor for vaginal dryness and her risk factors such as HTN, DM, weight, tobacco use, family history and lipids will also be evaluated for treatment options.    - Calcium Citrate-Vitamin D3 500 mg calcium -400 unit chew; Take 1 Tab by mouth two (2) times a day. Indications: VITAMIN D DEFICIENCY  Dispense: 60 Tab; Refill: 11      Written instructions followed our verbal discussion of all information discussed above, pending tests ordered and future goals/plans. Patient expressed understanding of current diagnosis, planned testing, follow up and if needed to contact the office for any questions or concerns prior to the next visit.      Plan:   Med reconciliation completed with patient. Reviewed side effects of medications with the patient. Questions were answered and patient verb understanding. Discussed lab results with patient  1. A1C decreased from 8.1 to 7.4   2. BUN 26 on HCTZ. Assess hydration   3. LDL 50   4. Anemia H/H 9.8/31.4 respectively        Orders Placed This Encounter    XR KNEE RT MIN 4 V     Standing Status:   Future     Standing Expiration Date:   7/1/2018     Order Specific Question:   Reason for Exam     Answer:   Right knee pain     Order Specific Question:   Is Patient Allergic to Contrast Dye? Answer:   No    DISCONTD: glyBURIDE (DIABETA) 1.25 mg tablet     Sig: Take 1.25 mg by mouth Daily (before breakfast).  lisinopril-hydroCHLOROthiazide (PRINZIDE, ZESTORETIC) 20-12.5 mg per tablet     Sig: Take 1 Tab by mouth daily. Indications: hypertension     Dispense:  30 Tab     Refill:  3    magnesium oxide (MAG-OX) 400 mg tablet     Sig: Take 1 Tab by mouth daily. Dispense:  30 Tab     Refill:  11    glipiZIDE (GLUCOTROL) 10 mg tablet     Sig: Take 1 Tab by mouth two (2) times a day. Dispense:  60 Tab     Refill:  3    metFORMIN (GLUCOPHAGE) 1,000 mg tablet     Sig: Take 1 Tab by mouth two (2) times daily (with meals). Dispense:  60 Tab     Refill:  3    acetaminophen (TYLENOL ARTHRITIS PAIN) 650 mg CR tablet     Sig: Take 1 Tab by mouth every eight (8) hours as needed for Pain. Indications: Arthritic Pain    Calcium Citrate-Vitamin D3 500 mg calcium -400 unit chew     Sig: Take 1 Tab by mouth two (2) times a day. Indications: VITAMIN D DEFICIENCY     Dispense:  60 Tab     Refill:  11     Current Outpatient Prescriptions   Medication Sig Dispense Refill    lisinopril-hydroCHLOROthiazide (PRINZIDE, ZESTORETIC) 20-12.5 mg per tablet Take 1 Tab by mouth daily. Indications: hypertension 30 Tab 3    magnesium oxide (MAG-OX) 400 mg tablet Take 1 Tab by mouth daily.  30 Tab 11    glipiZIDE (GLUCOTROL) 10 mg tablet Take 1 Tab by mouth two (2) times a day. 60 Tab 3    metFORMIN (GLUCOPHAGE) 1,000 mg tablet Take 1 Tab by mouth two (2) times daily (with meals). 60 Tab 3    acetaminophen (TYLENOL ARTHRITIS PAIN) 650 mg CR tablet Take 1 Tab by mouth every eight (8) hours as needed for Pain. Indications: Arthritic Pain      Calcium Citrate-Vitamin D3 500 mg calcium -400 unit chew Take 1 Tab by mouth two (2) times a day. Indications: VITAMIN D DEFICIENCY 60 Tab 11    pitavastatin (LIVALO) 2 mg tablet Take 1 Tab by mouth daily. 90 Tab 0    ferrous sulfate 325 mg (65 mg iron) tablet Take 1 Tab by mouth Daily (before breakfast). Indications: IRON DEFICIENCY ANEMIA 30 Tab 11    cyclobenzaprine (FLEXERIL) 10 mg tablet Take 1 Tab by mouth nightly as needed for Muscle Spasm(s). Indications: MUSCLE SPASM 30 Tab 3    OTHER Alphabetic vitamins take as directed      naproxen (NAPROSYN) 500 mg tablet Take 1 Tab by mouth two (2) times daily as needed. Indications: PAIN 60 Tab 3     Medications Discontinued During This Encounter   Medication Reason    rosuvastatin (CRESTOR) 20 mg tablet Alternate Therapy    glyBURIDE (DIABETA) 1.25 mg tablet Not A Current Medication    lisinopril-hydroCHLOROthiazide (PRINZIDE, ZESTORETIC) 20-25 mg per tablet Dose Adjustment    magnesium oxide (MAG-OX) 400 mg tablet Reorder    glipiZIDE (GLUCOTROL) 10 mg tablet Reorder    metFORMIN (GLUCOPHAGE) 1,000 mg tablet Reorder       Follow-up Disposition:  Return in about 3 months (around 9/1/2017), or if symptoms worsen or fail to improve. Labs needed for follow-up appt    \"No Show policy was reviewed with the patient. The services affected are the nurse navigator and the provider. No show appointments include missing labs for a future scheduled appointment, Pap/pelvics, arriving to appointment more than 10 minutes late, and calling to cancel appointment less than 24 hours in advance.  After the 3rd No Show, the patient will be removed from the [facility] to include medications for 6 months. The patient will be referred to the [place] [id number] for their primary care needs. \"     Kat Ahumada, [address]      I spent 25 minutes with the patient in face-to-face consultation, of which greater than 50% was spent in counseling and coordination of care as described above.

## 2017-06-01 NOTE — PATIENT INSTRUCTIONS
The [facility] reminders! Foundation Operating Hours: These may change without notice. Mon- Wed 7am to 5pm. Closed for lunch 12-1pm  Thurs 7am to 12pm  Fridays closed     NO SHOW POLICY ~ If a patient has 3 no shows for an appointment with the Provider, Mental Health Provider, or the Nurse Navigator in 6 months, they will be discharged from the practice for 6 months. Medication ordering will also be suspended. If the patient is discharged from the [facility], they can go to the [place] [address] where they can be seen for the primary needs plus obtain the same types of medications as they receive at the [facility]. To avoid being discharged, the patient must call the office at [phone] 24 hours prior to their appointment if they need to cancel, arrive to their appointments on time and come to all scheduled appointments. If the patient is discharged from the [facility], they can apply to be re-established after 12 months. Lab work:  Unless you are instructed differently, please return to the office between the hours of 7 am and 10:30 am Monday through Thursday to have your labs drawn one week before your next scheduled PROVIDER visit. If you do not have an appointment to follow up on these results, please make one or plan to call the office if you do not hear from us to get the results. No news does not mean good news. Medications: If your medications are new or have changed, and you get your medications from the clinic pharmacy (C), you MUST talk to the pharmacy staff to sign the new prescription applications. If you don't sign the applications we cannot get the medications for you. It usually takes 6-8 weeks for your medications to arrive. The Pharmacy staff will call you when your medications are available. You will have 30 days to come in and  your medications.  If you don't  your medicines within those 30 days, those medicines will be placed on the self as samples and you will have to start all over again by completing the applications and waiting the 6-8 weeks for your medicines to arrive. This is firm and there will be no exceptions! ! The [facility] or [facility] will assist you with your medications if available but not all medications are available so some may be obtained through local pharmacies such as [facility] that has a large $4 list and [facility] that has many drugs for free or also for $4.  We will work hard to get the best medications for you that you can also afford. [facility] Medication pick-up times:  Monday-Tuesday 1:00 -5:00 PM  Wednesday- Thursday 8:00 -11:30 AM      Feet Care: Local [facility] through [facility]  Every second Tuesday of the month (except for holidays and election days) from 9am to 1 pm. The services provided by these ministry volunteers are free of charge with the option to donate. They will inspect your feet thoroughly, soak them for 10 minutes, cut and file your nails. They care for diabetics as well. Keep in mind this service is free and will be on a first come first serve basis. Bad teeth? Ask about the Dental Bus to get you in front of a local dentist. The bus leaves every other Wednesday for those on the list. (Ask about availability as these appointments are limited)    Eye exams for Diabetics. Please let us know so we can add you to the list to see the eye doctor at [facility]. You will receive a free eye exam and free glasses if needed. Unfortunately, if you are not a diabetic, we do not have a free service for eye exams for you (yet!). We do have information on where to go to get a huge discount on eye exams and glasses. Sick visits: If you are sick and it is not an emergency call the office to see if you can schedule an appointment.      Charges and cost items from the clinic:  Most of our orders are covered by [employer] but there ARE SOME CHARGES for items such as radiology interpretations and anesthesiology during procedures and surgeries. Please make sure you have contacted the Advanced Patient Advocacy (APA) group to check on your payment options: [email or web address]. or come in and talk to them in person: On  Tuesdays, we have Advanced Patient Advocacy is available Mon - Fri 8-4pm at [facility] on the first floor by the information desk. Their number is [phone]. It is important that you are screened in order to qualify for assistance and to avoid huge medical charges. The [facility] is not responsible for ANY charges you may accrue regardless of who ordered the medication, procedure, treatment or test. If you go to the Emergency Room, you WILL be charged! Behavior and emotional issues! It is stressful to be sick, have an illness, take medications, not have a job, not have medical insurance, have family issues or just getting older! Schedule an appointment with our mental health provider. She is in the office Mondays and Wednesdays from 8am to 08449 [facility] can also contact the following: The national suicide hotline (1-720-925-WJOU or 2-244.549.3931)    [address] [facility], [address] [place] [address]    Walk- in hours Monday -Wednesday   8:30 am -11:15 AM  2:15pm -4:15 pm    [employer] ([facility]) - [place]  [address], [address]  [phone]        The [place] Department for Aging and The [facility], in collaboration with community partners, provides and advocates for resources and services to improve the employment, quality of life, security, and independence of older [address], [address] with disabilities, and their families. Department for Aging and Rehabilitative Services  [address] Location: [address]   Voice: [address] [place]: [phone]  E-mail: [email or web address]      Immunizations/Vaccination  Routine Immunizations are provided for infants, children, teens, and adults at the [facility]. Please bring all immunization records with you and present them at the time of registration. Phone [phone] extension [phone]  Hours (Walk in)  Monday, Tuesday, Wednesday and Friday  8:00 AM to 11:00 AM  12:30 PM to 3:00 PM      Drug and Alcohol Addiction Issues! It is hard to stop a poor habit but there is help out there. Please feel free to attend any other the following support groups to help you kick the habit or go to [facility] to be evaluated by the psychiatric team. Never give up!! AlAnon meetings: [place], [place], [place]    [facility] MONDAY 7:30 PM JUST FOR TODAY [employer] [facility] [address] [facility] WEDNESDAY 10:30 AM [employer] [facility] OF [facility], ROOM [address] [place], [place] [address] WEDNESDAY 8:00  [address] 8:00 PM KEEP IT SIMPLE [employer] Al-Dat [facility] [address]     [facility] THURSDAY 8:00 PM LET IT BEGIN WITH ME [employer] Mary [facility] [address] 6;30 PM [place] [employer] [place] [address] [facility] [address] [facility]      [place] MONDAY 10:30 AM [employer] [address] [facility] SATURDAY 8:00 PM [facility] SATURDAY NIGHT [employer] [facility] [address] [facility] MONDAY 7:00 PM MONDAY NIGHT AFG Al-Dat J[facility] [address]     [place] WEDNESDAY 8:00 PM [employer] Al[employer]ion [facility] [address]       Knee Pain or Injury: Care Instructions  Your Care Instructions    Injuries are a common cause of knee problems. Sudden (acute) injuries may be caused by a direct blow to the knee.  They can also be caused by abnormal twisting, bending, or falling on the knee. Pain, bruising, or swelling may be severe, and may start within minutes of the injury. Overuse is another cause of knee pain. Other causes are climbing stairs, kneeling, and other activities that use the knee. Everyday wear and tear, especially as you get older, also can cause knee pain. Rest, along with home treatment, often relieves pain and allows your knee to heal. If you have a serious knee injury, you may need tests and treatment. Follow-up care is a key part of your treatment and safety. Be sure to make and go to all appointments, and call your doctor if you are having problems. It's also a good idea to know your test results and keep a list of the medicines you take. How can you care for yourself at home? · Be safe with medicines. Read and follow all instructions on the label. ¨ If the doctor gave you a prescription medicine for pain, take it as prescribed. ¨ If you are not taking a prescription pain medicine, ask your doctor if you can take an over-the-counter medicine. · Rest and protect your knee. Take a break from any activity that may cause pain. · Put ice or a cold pack on your knee for 10 to 20 minutes at a time. Put a thin cloth between the ice and your skin. · Prop up a sore knee on a pillow when you ice it or anytime you sit or lie down for the next 3 days. Try to keep it above the level of your heart. This will help reduce swelling. · If your knee is not swollen, you can put moist heat, a heating pad, or a warm cloth on your knee. · If your doctor recommends an elastic bandage, sleeve, or other type of support for your knee, wear it as directed. · Follow your doctor's instructions about how much weight you can put on your leg. Use a cane, crutches, or a walker as instructed. · Follow your doctor's instructions about activity during your healing process. If you can do mild exercise, slowly increase your activity.   · Reach and stay at a healthy weight. Extra weight can strain the joints, especially the knees and hips, and make the pain worse. Losing even a few pounds may help. When should you call for help? Call 911 anytime you think you may need emergency care. For example, call if:  · You have symptoms of a blood clot in your lung (called a pulmonary embolism). These may include:  ¨ Sudden chest pain. ¨ Trouble breathing. ¨ Coughing up blood. Call your doctor now or seek immediate medical care if:  · You have severe or increasing pain. · Your leg or foot turns cold or changes color. · You cannot stand or put weight on your knee. · Your knee looks twisted or bent out of shape. · You cannot move your knee. · You have signs of infection, such as:  ¨ Increased pain, swelling, warmth, or redness. ¨ Red streaks leading from the knee. ¨ Pus draining from a place on your knee. ¨ A fever. · You have signs of a blood clot in your leg (called a deep vein thrombosis), such as:  ¨ Pain in your calf, back of the knee, thigh, or groin. ¨ Redness and swelling in your leg or groin. Watch closely for changes in your health, and be sure to contact your doctor if:  · You have tingling, weakness, or numbness in your knee. · You have any new symptoms, such as swelling. · You have bruises from a knee injury that last longer than 2 weeks. · You do not get better as expected. Where can you learn more? Go to http://dixon-edilson.info/. Enter K195 in the search box to learn more about \"Knee Pain or Injury: Care Instructions. \"  Current as of: May 27, 2016  Content Version: 11.2  © 5123-0352 [employer]. Care instructions adapted under license by [employer] (which disclaims liability or warranty for this information).  If you have questions about a medical condition or this instruction, always ask your healthcare professional. [address] any warranty or liability for your use of this information.

## 2017-06-19 DIAGNOSIS — M62.830 SPASM OF LUMBAR PARASPINOUS MUSCLE: ICD-10-CM

## 2017-06-22 RX ORDER — CYCLOBENZAPRINE HCL 10 MG
TABLET ORAL
Qty: 30 TAB | Refills: 0 | Status: SHIPPED | OUTPATIENT
Start: 2017-06-22 | End: 2017-07-22 | Stop reason: SDUPTHER

## 2017-06-27 DIAGNOSIS — M62.830 SPASM OF LUMBAR PARASPINOUS MUSCLE: ICD-10-CM

## 2017-06-27 RX ORDER — NAPROXEN 500 MG/1
500 TABLET ORAL
Qty: 60 TAB | Refills: 3 | OUTPATIENT
Start: 2017-06-27

## 2017-06-28 RX ORDER — NAPROXEN 500 MG/1
500 TABLET ORAL
Qty: 60 TAB | Refills: 3 | Status: SHIPPED | OUTPATIENT
Start: 2017-06-28 | End: 2017-09-07

## 2017-07-22 DIAGNOSIS — M62.830 SPASM OF LUMBAR PARASPINOUS MUSCLE: ICD-10-CM

## 2017-07-26 RX ORDER — CYCLOBENZAPRINE HCL 10 MG
TABLET ORAL
Qty: 30 TAB | Refills: 0 | Status: SHIPPED | OUTPATIENT
Start: 2017-07-26 | End: 2017-09-07 | Stop reason: SDUPTHER

## 2017-08-08 ENCOUNTER — TELEPHONE (OUTPATIENT)
Dept: FAMILY MEDICINE CLINIC | Age: 62
End: 2017-08-08

## 2017-08-08 NOTE — TELEPHONE ENCOUNTER
Medication: Livalo , dose: 2 mg, how often: Daily , current number of medication days provided: 90, refill per application. Lot #: [id number], EXP 03/2018. This medication was received and verified for the following 1. Correct Patient, 2. Correct Diagnosis, 3. Correct Drug, 4. Correct route, and no current allergy to medication. Please contact patient to come  their medications.      MALIA Hunt, RN, [facility]

## 2017-08-29 ENCOUNTER — HOSPITAL ENCOUNTER (OUTPATIENT)
Dept: LAB | Age: 62
Discharge: HOME | End: 2017-08-29

## 2017-08-29 ENCOUNTER — LAB ONLY (OUTPATIENT)
Dept: FAMILY MEDICINE CLINIC | Age: 62
End: 2017-08-29

## 2017-08-29 DIAGNOSIS — R79.9 ELEVATED BUN: ICD-10-CM

## 2017-08-29 DIAGNOSIS — E11.8 DIABETIC COMPLICATION (HCC): Primary | ICD-10-CM

## 2017-08-29 DIAGNOSIS — R79.9 ABNORMAL BLOOD CHEMISTRY: ICD-10-CM

## 2017-08-29 DIAGNOSIS — E78.5 HYPERLIPIDEMIA WITH TARGET LDL LESS THAN 100: ICD-10-CM

## 2017-08-29 DIAGNOSIS — D50.9 IRON DEFICIENCY ANEMIA, UNSPECIFIED IRON DEFICIENCY ANEMIA TYPE: ICD-10-CM

## 2017-08-29 DIAGNOSIS — E11.8 TYPE 2 DIABETES MELLITUS WITH COMPLICATION, WITHOUT LONG-TERM CURRENT USE OF INSULIN (HCC): ICD-10-CM

## 2017-08-29 LAB
ALBUMIN SERPL-MCNC: 4.1 G/DL (ref 3.4–5)
ALBUMIN/GLOB SERPL: 1.2 {RATIO} (ref 0.8–1.7)
ALP SERPL-CCNC: 68 U/L (ref 45–117)
ALT SERPL-CCNC: 21 U/L (ref 13–56)
ANION GAP SERPL CALC-SCNC: 9 MMOL/L (ref 3–18)
AST SERPL-CCNC: 20 U/L (ref 15–37)
BASOPHILS # BLD: 0 K/UL (ref 0–0.06)
BASOPHILS NFR BLD: 0 % (ref 0–2)
BILIRUB SERPL-MCNC: 0.5 MG/DL (ref 0.2–1)
BUN SERPL-MCNC: 26 MG/DL (ref 7–18)
BUN/CREAT SERPL: 19 (ref 12–20)
CALCIUM SERPL-MCNC: 9.1 MG/DL (ref 8.5–10.1)
CHLORIDE SERPL-SCNC: 103 MMOL/L (ref 100–108)
CHOLEST SERPL-MCNC: 137 MG/DL
CO2 SERPL-SCNC: 28 MMOL/L (ref 21–32)
CREAT SERPL-MCNC: 1.36 MG/DL (ref 0.6–1.3)
DIFFERENTIAL METHOD BLD: ABNORMAL
EOSINOPHIL # BLD: 0.2 K/UL (ref 0–0.4)
EOSINOPHIL NFR BLD: 2 % (ref 0–5)
ERYTHROCYTE [DISTWIDTH] IN BLOOD BY AUTOMATED COUNT: 12.9 % (ref 11.6–14.5)
EST. AVERAGE GLUCOSE BLD GHB EST-MCNC: 151 MG/DL
GLOBULIN SER CALC-MCNC: 3.3 G/DL (ref 2–4)
GLUCOSE SERPL-MCNC: 130 MG/DL (ref 74–99)
HBA1C MFR BLD: 6.9 % (ref 4.2–5.6)
HCT VFR BLD AUTO: 33.8 % (ref 35–45)
HDLC SERPL-MCNC: 72 MG/DL (ref 40–60)
HDLC SERPL: 1.9 {RATIO} (ref 0–5)
HGB BLD-MCNC: 10.4 G/DL (ref 12–16)
LDLC SERPL CALC-MCNC: 46.8 MG/DL (ref 0–100)
LIPID PROFILE,FLP: ABNORMAL
LYMPHOCYTES # BLD: 3 K/UL (ref 0.9–3.6)
LYMPHOCYTES NFR BLD: 35 % (ref 21–52)
MCH RBC QN AUTO: 27.2 PG (ref 24–34)
MCHC RBC AUTO-ENTMCNC: 30.8 G/DL (ref 31–37)
MCV RBC AUTO: 88.5 FL (ref 74–97)
MONOCYTES # BLD: 0.9 K/UL (ref 0.05–1.2)
MONOCYTES NFR BLD: 11 % (ref 3–10)
NEUTS SEG # BLD: 4.4 K/UL (ref 1.8–8)
NEUTS SEG NFR BLD: 52 % (ref 40–73)
PLATELET # BLD AUTO: 288 K/UL (ref 135–420)
PMV BLD AUTO: 10.6 FL (ref 9.2–11.8)
POTASSIUM SERPL-SCNC: 4.1 MMOL/L (ref 3.5–5.5)
PROT SERPL-MCNC: 7.4 G/DL (ref 6.4–8.2)
RBC # BLD AUTO: 3.82 M/UL (ref 4.2–5.3)
SODIUM SERPL-SCNC: 140 MMOL/L (ref 136–145)
TRIGL SERPL-MCNC: 91 MG/DL (ref ?–150)
VLDLC SERPL CALC-MCNC: 18.2 MG/DL
WBC # BLD AUTO: 8.6 K/UL (ref 4.6–13.2)

## 2017-08-29 PROCEDURE — 80061 LIPID PANEL: CPT | Performed by: NURSE PRACTITIONER

## 2017-08-29 PROCEDURE — 83036 HEMOGLOBIN GLYCOSYLATED A1C: CPT | Performed by: NURSE PRACTITIONER

## 2017-08-29 PROCEDURE — 80053 COMPREHEN METABOLIC PANEL: CPT | Performed by: NURSE PRACTITIONER

## 2017-08-29 PROCEDURE — 85025 COMPLETE CBC W/AUTO DIFF WBC: CPT | Performed by: NURSE PRACTITIONER

## 2017-08-29 NOTE — PROGRESS NOTES
Verified patient name, , demographics and orders. Venipuncture for labs performed using 23G butterfly Right AC using aseptic technique. Skin intact and dry. No active bleeding or complications noted. Bandaid dressing applied.

## 2017-08-29 NOTE — PROGRESS NOTES
Will review results with pt at 9/7/17 appt  1. A1C decreased to 6.9 from 7.4  2.  Cr slightly increased at 1.36 and BUN elevated at 1.36 with decrease in GFR, decrease use of NSAIDS and possibly d/c HCTZ was previously Decreased

## 2017-09-07 ENCOUNTER — OFFICE VISIT (OUTPATIENT)
Dept: FAMILY MEDICINE CLINIC | Age: 62
End: 2017-09-07

## 2017-09-07 VITALS
RESPIRATION RATE: 20 BRPM | BODY MASS INDEX: 34.55 KG/M2 | SYSTOLIC BLOOD PRESSURE: 111 MMHG | OXYGEN SATURATION: 99 % | DIASTOLIC BLOOD PRESSURE: 71 MMHG | HEIGHT: 63 IN | TEMPERATURE: 98.5 F | HEART RATE: 103 BPM | WEIGHT: 195 LBS

## 2017-09-07 DIAGNOSIS — R79.9 ELEVATED BUN: ICD-10-CM

## 2017-09-07 DIAGNOSIS — M62.830 SPASM OF LUMBAR PARASPINOUS MUSCLE: ICD-10-CM

## 2017-09-07 DIAGNOSIS — I10 HTN, GOAL BELOW 140/90: ICD-10-CM

## 2017-09-07 DIAGNOSIS — R94.4 DECREASED GFR: ICD-10-CM

## 2017-09-07 DIAGNOSIS — D64.9 MILD CHRONIC ANEMIA: ICD-10-CM

## 2017-09-07 DIAGNOSIS — E11.8 CONTROLLED DIABETES MELLITUS TYPE 2 WITH COMPLICATIONS, UNSPECIFIED LONG TERM INSULIN USE STATUS: Primary | ICD-10-CM

## 2017-09-07 DIAGNOSIS — Z12.11 COLON CANCER SCREENING: ICD-10-CM

## 2017-09-07 DIAGNOSIS — E78.5 HYPERLIPIDEMIA LDL GOAL <70: ICD-10-CM

## 2017-09-07 RX ORDER — LISINOPRIL 20 MG/1
20 TABLET ORAL DAILY
Qty: 30 TAB | Refills: 0 | Status: SHIPPED | OUTPATIENT
Start: 2017-09-07 | End: 2017-09-21 | Stop reason: SDUPTHER

## 2017-09-07 RX ORDER — CYCLOBENZAPRINE HCL 10 MG
10 TABLET ORAL
Qty: 30 TAB | Refills: 2 | Status: SHIPPED | OUTPATIENT
Start: 2017-09-07 | End: 2017-12-03 | Stop reason: SDUPTHER

## 2017-09-07 RX ORDER — GLIPIZIDE 10 MG/1
10 TABLET ORAL 2 TIMES DAILY
Qty: 60 TAB | Refills: 4 | Status: SHIPPED | OUTPATIENT
Start: 2017-09-07 | End: 2018-01-15 | Stop reason: SDUPTHER

## 2017-09-07 RX ORDER — METFORMIN HYDROCHLORIDE 1000 MG/1
1000 TABLET ORAL 2 TIMES DAILY WITH MEALS
Qty: 60 TAB | Refills: 4 | Status: SHIPPED | OUTPATIENT
Start: 2017-09-07 | End: 2018-01-15 | Stop reason: SDUPTHER

## 2017-09-07 NOTE — PATIENT INSTRUCTIONS
Anemia: Care Instructions  Your Care Instructions    Anemia is a low level of red blood cells, which carry oxygen throughout your body. Many things can cause anemia. Lack of iron is one of the most common causes. Your body needs iron to make hemoglobin, a substance in red blood cells that carries oxygen from the lungs to your body's cells. Without enough iron, the body produces fewer and smaller red blood cells. As a result, your body's cells do not get enough oxygen, and you feel tired and weak. And you may have trouble concentrating. Bleeding is the most common cause of a lack of iron. You may have heavy menstrual bleeding or bleeding caused by conditions such as ulcers, hemorrhoids, or cancer. Regular use of aspirin or other anti-inflammatory medicines (such as ibuprofen) also can cause bleeding in some people. A lack of iron in your diet also can cause anemia, especially at times when the body needs more iron, such as during pregnancy, infancy, and the teen years. Your doctor may have prescribed iron pills. It may take several months of treatment for your iron levels to return to normal. Your doctor also may suggest that you eat foods that are rich in iron, such as meat and beans. There are many other causes of anemia. It is not always due to a lack of iron. Finding the specific cause of your anemia will help your doctor find the right treatment for you. Follow-up care is a key part of your treatment and safety. Be sure to make and go to all appointments, and call your doctor if you are having problems. It's also a good idea to know your test results and keep a list of the medicines you take. How can you care for yourself at home? · Take your medicines exactly as prescribed. Call your doctor if you think you are having a problem with your medicine. · If your doctor recommends iron pills, take them as directed:  ¨ Try to take the pills on an empty stomach about 1 hour before or 2 hours after meals. But you may need to take iron with food to avoid an upset stomach. ¨ Do not take antacids or drink milk or caffeine drinks (such as coffee, tea, or cola) at the same time or within 2 hours of the time that you take your iron. They can make it hard for your body to absorb the iron. ¨ Vitamin C (from food or supplements) helps your body absorb iron. Try taking iron pills with a glass of orange juice or some other food that is high in vitamin C, such as citrus fruits. ¨ Iron pills may cause stomach problems, such as heartburn, nausea, diarrhea, constipation, and cramps. Be sure to drink plenty of fluids, and include fruits, vegetables, and fiber in your diet each day. Iron pills often make your bowel movements dark or green. ¨ If you forget to take an iron pill, do not take a double dose of iron the next time you take a pill. ¨ Keep iron pills out of the reach of small children. An overdose of iron can be very dangerous. · Follow your doctor's advice about eating iron-rich foods. These include red meat, shellfish, poultry, eggs, beans, raisins, whole-grain bread, and leafy green vegetables. · Steam vegetables to help them keep their iron content. When should you call for help? Call 911 anytime you think you may need emergency care. For example, call if:  · You have symptoms of a heart attack. These may include:  ¨ Chest pain or pressure, or a strange feeling in the chest.  ¨ Sweating. ¨ Shortness of breath. ¨ Nausea or vomiting. ¨ Pain, pressure, or a strange feeling in the back, neck, jaw, or upper belly or in one or both shoulders or arms. ¨ Lightheadedness or sudden weakness. ¨ A fast or irregular heartbeat. After you call 911, the  may tell you to chew 1 adult-strength or 2 to 4 low-dose aspirin. Wait for an ambulance. Do not try to drive yourself. · You passed out (lost consciousness).   Call your doctor now or seek immediate medical care if:  · You have new or increased shortness of breath. · You are dizzy or lightheaded, or you feel like you may faint. · Your fatigue and weakness continue or get worse. · You have any abnormal bleeding, such as:  ¨ Nosebleeds. ¨ Vaginal bleeding that is different (heavier, more frequent, at a different time of the month) than what you are used to. ¨ Bloody or black stools, or rectal bleeding. ¨ Bloody or pink urine. Watch closely for changes in your health, and be sure to contact your doctor if:  · You do not get better as expected. Where can you learn more? Go to http://dixon-edilson.info/. Enter R301 in the search box to learn more about \"Anemia: Care Instructions. \"  Current as of: October 13, 2016  Content Version: 11.3  © 7726-8099 "[employer]. Care instructions adapted under license by [employer] (which disclaims liability or warranty for this information). If you have questions about a medical condition or this instruction, always ask your healthcare professional. [place] [address] any warranty or liability for your use of this information.

## 2017-09-07 NOTE — LETTER
9/7/2017 [facility] [address], [address] Rama Nguyen, 1955, is picking up the following medications ordered from the [facility]: LIVALO 2 MG #90 [place] Patient's Signature: _____________________________ Today's Date: 9/7/2017

## 2017-09-07 NOTE — MR AVS SNAPSHOT
Visit Information Date & Time Provider Department Dept. Phone Encounter #  
 9/7/2017  9:30 AM Leyda Craft NP [address] [id number] Your Appointments 9/21/2017  8:00 AM  
CONSULT with NURSE NAVIGATOR [address] ([facility]) Appt Note: bp check [address] [facility] [phone]  
[address] [facility] [address]  
  
    
 1/4/2018  9:00 AM  
PAP/PELVIC with Leyda Craft NP [address] [place] [address] ([facility]) Appt Note: Pap w/labs [address] [facility] [phone]  
[address] [facility] [phone]  
  
    
 1/11/2018  9:30 AM  
Follow Up with Leyda Craft NP [address] [place] [address] ([facility]) Appt Note: 4 mth follow up  
 [address] [facility] [phone]  
[address] [facility] [address] Upcoming Health Maintenance Date Due DTaP/Tdap/Td series (1 - Tdap) 10/16/1976 BREAST CANCER SCRN MAMMOGRAM 10/16/2005 FOBT Q 1 YEAR AGE 50-75 10/16/2005 ZOSTER VACCINE AGE 60> 8/16/2015 PAP AKA CERVICAL CYTOLOGY 7/9/2017 INFLUENZA AGE 9 TO ADULT 8/1/2017 Pneumococcal 19-64 Medium Risk (1 of 1 - PPSV23) 3/2/2018* EYE EXAM RETINAL OR DILATED Q1 6/1/2018* HEMOGLOBIN A1C Q6M 2/28/2018 FOOT EXAM Q1 3/2/2018 MICROALBUMIN Q1 5/22/2018 LIPID PANEL Q1 8/29/2018 *Topic was postponed. The date shown is not the original due date. Allergies as of 9/7/2017  Review Complete On: 9/7/2017 By: Niranjan Pedraza. Kayleigh Emmanuel LPN Severity Noted Reaction Type Reactions Codeine  11/28/2012    Nausea and Vomiting Januvia [Sitagliptin]  07/09/2014   Side Effect Nausea Only, Myalgia Pcn [Penicillins]  11/28/2012    Swelling Current Immunizations  Reviewed on 3/2/2017 Name Date Influenza Vaccine (Quad) PF 3/2/2017 Influenza Vaccine Split 11/28/2012 Not reviewed this visit You Were Diagnosed With   
  
 Codes Comments Controlled diabetes mellitus type 2 with complications, unspecified long term insulin use status ([facility]    -  Primary ICD-10-CM: E11.8 ICD-9-CM: 250.90 Mild chronic anemia     ICD-10-CM: D64.9 ICD-9-CM: 285.9 Hyperlipidemia LDL goal <70     ICD-10-CM: E78.5 ICD-9-CM: 272.4 Colon cancer screening     ICD-10-CM: Z12.11 ICD-9-CM: V76.51 Decreased GFR     ICD-10-CM: R94.4 ICD-9-CM: 794.4 Elevated BUN     ICD-10-CM: R79.9 ICD-9-CM: 790.6 HTN, goal below 140/90     ICD-10-CM: I10 
ICD-9-CM: 401.9 Spasm of lumbar paraspinous muscle     ICD-10-CM: Y66.779 ICD-9-CM: 724.8 Vitals BP Pulse Temp Resp Height(growth percentile) Weight(growth percentile) 111/71 (!) 103 98.5 °F (36.9 °C) 20 5' 3\" (1.6 m) 195 lb (88.5 kg) SpO2 BMI OB Status Smoking Status 99% 34.54 kg/m2 Postmenopausal Never Smoker BMI and BSA Data Body Mass Index Body Surface Area 34.54 kg/m 2 1.98 m 2 Preferred Pharmacy Pharmacy Name Phone [facility] [address] [phone] Your Updated Medication List  
  
   
This list is accurate as of: 9/7/17 10:22 AM.  Always use your most recent med list.  
  
  
  
  
 Calcium Citrate-Vitamin D3 500 mg calcium -400 unit Chew Take 1 Tab by mouth two (2) times a day. Indications: VITAMIN D DEFICIENCY  
  
 cyclobenzaprine 10 mg tablet Commonly known as:  FLEXERIL Take 1 Tab by mouth nightly. Indications: MUSCLE SPASM  
  
 ferrous sulfate 325 mg (65 mg iron) tablet Take 1 Tab by mouth Daily (before breakfast). Indications: IRON DEFICIENCY ANEMIA  
  
 glipiZIDE 10 mg tablet Commonly known as:  Denzel Guerra Take 1 Tab by mouth two (2) times a day. lisinopril 20 mg tablet Commonly known as:  Marissa Dowdy Take 1 Tab by mouth daily. magnesium oxide 400 mg tablet Commonly known as:  MAG-OX Take 1 Tab by mouth daily. metFORMIN 1,000 mg tablet Commonly known as:  GLUCOPHAGE Take 1 Tab by mouth two (2) times daily (with meals). OTHER Alphabetic vitamins take as directed  
  
 pitavastatin calcium 2 mg tablet Commonly known as:  LIVALO Take 1 Tab by mouth daily. TYLENOL ARTHRITIS PAIN 650 mg CR tablet Generic drug:  acetaminophen Take 1 Tab by mouth every eight (8) hours as needed for Pain. Indications: Arthritic Pain Prescriptions Sent to Pharmacy Refills  
 lisinopril (PRINIVIL, ZESTRIL) 20 mg tablet 0 Sig: Take 1 Tab by mouth daily. Class: Normal  
 Pharmacy: [place] [address]. Ph #: [phone] Route: Oral  
 metFORMIN (GLUCOPHAGE) 1,000 mg tablet 4 Sig: Take 1 Tab by mouth two (2) times daily (with meals). Class: Normal  
 Pharmacy: [place] [address]. Ph #: [phone] Route: Oral  
 glipiZIDE (GLUCOTROL) 10 mg tablet 4 Sig: Take 1 Tab by mouth two (2) times a day. Class: Normal  
 Pharmacy: [place] [address]. Ph #: [phone] Route: Oral  
 cyclobenzaprine (FLEXERIL) 10 mg tablet 2 Sig: Take 1 Tab by mouth nightly. Indications: MUSCLE SPASM Class: Normal  
 Pharmacy: [place] [address]. Ph #: [phone] Route: Oral  
  
To-Do List   
 10/07/2017 Lab:  OCCULT BLOOD, IMMUNOASSAY (FIT)   
  
 04/05/2018 Lab:  CBC WITH AUTOMATED DIFF   
  
 04/05/2018 Lab:  HEMOGLOBIN A1C WITH EAG   
  
 04/05/2018 Lab:  LIPID PANEL   
  
 04/05/2018 Lab:  MAGNESIUM   
  
 04/05/2018   Lab:  METABOLIC PANEL, COMPREHENSIVE   
  
 04/05/2018 Lab:  MICROALBUMIN, UR, RAND W/ MICROALBUMIN/CREA RATIO Patient Instructions Anemia: Care Instructions Your Care Instructions Anemia is a low level of red blood cells, which carry oxygen throughout your body. Many things can cause anemia. Lack of iron is one of the most common causes. Your body needs iron to make hemoglobin, a substance in red blood cells that carries oxygen from the lungs to your body's cells. Without enough iron, the body produces fewer and smaller red blood cells. As a result, your body's cells do not get enough oxygen, and you feel tired and weak. And you may have trouble concentrating. Bleeding is the most common cause of a lack of iron. You may have heavy menstrual bleeding or bleeding caused by conditions such as ulcers, hemorrhoids, or cancer. Regular use of aspirin or other anti-inflammatory medicines (such as ibuprofen) also can cause bleeding in some people. A lack of iron in your diet also can cause anemia, especially at times when the body needs more iron, such as during pregnancy, infancy, and the teen years. Your doctor may have prescribed iron pills. It may take several months of treatment for your iron levels to return to normal. Your doctor also may suggest that you eat foods that are rich in iron, such as meat and beans. There are many other causes of anemia. It is not always due to a lack of iron. Finding the specific cause of your anemia will help your doctor find the right treatment for you. Follow-up care is a key part of your treatment and safety. Be sure to make and go to all appointments, and call your doctor if you are having problems. It's also a good idea to know your test results and keep a list of the medicines you take. How can you care for yourself at home? · Take your medicines exactly as prescribed. Call your doctor if you think you are having a problem with your medicine. · If your doctor recommends iron pills, take them as directed: ¨ Try to take the pills on an empty stomach about 1 hour before or 2 hours after meals. But you may need to take iron with food to avoid an upset stomach. ¨ Do not take antacids or drink milk or caffeine drinks (such as coffee, tea, or cola) at the same time or within 2 hours of the time that you take your iron. They can make it hard for your body to absorb the iron. ¨ Vitamin C (from food or supplements) helps your body absorb iron. Try taking iron pills with a glass of orange juice or some other food that is high in vitamin C, such as citrus fruits. ¨ Iron pills may cause stomach problems, such as heartburn, nausea, diarrhea, constipation, and cramps. Be sure to drink plenty of fluids, and include fruits, vegetables, and fiber in your diet each day. Iron pills often make your bowel movements dark or green. ¨ If you forget to take an iron pill, do not take a double dose of iron the next time you take a pill. ¨ Keep iron pills out of the reach of small children. An overdose of iron can be very dangerous. · Follow your doctor's advice about eating iron-rich foods. These include red meat, shellfish, poultry, eggs, beans, raisins, whole-grain bread, and leafy green vegetables. · Steam vegetables to help them keep their iron content. When should you call for help? Call 911 anytime you think you may need emergency care. For example, call if: 
· You have symptoms of a heart attack. These may include: ¨ Chest pain or pressure, or a strange feeling in the chest. 
¨ Sweating. ¨ Shortness of breath. ¨ Nausea or vomiting. ¨ Pain, pressure, or a strange feeling in the back, neck, jaw, or upper belly or in one or both shoulders or arms. ¨ Lightheadedness or sudden weakness. ¨ A fast or irregular heartbeat. After you call 911, the  may tell you to chew 1 adult-strength or 2 to 4 low-dose aspirin. Wait for an ambulance.  Do not try to drive yourself. · You passed out (lost consciousness). Call your doctor now or seek immediate medical care if: 
· You have new or increased shortness of breath. · You are dizzy or lightheaded, or you feel like you may faint. · Your fatigue and weakness continue or get worse. · You have any abnormal bleeding, such as: 
¨ Nosebleeds. ¨ Vaginal bleeding that is different (heavier, more frequent, at a different time of the month) than what you are used to. ¨ Bloody or black stools, or rectal bleeding. ¨ Bloody or pink urine. Watch closely for changes in your health, and be sure to contact your doctor if: 
· You do not get better as expected. Where can you learn more? Go to http://dixon-edilson.info/. Enter R301 in the search box to learn more about \"Anemia: Care Instructions. \" Current as of: October 13, 2016 Content Version: 11.3 © 1550-1340 [employer]. Care instructions adapted under license by [employer] (which disclaims liability or warranty for this information). If you have questions about a medical condition or this instruction, always ask your healthcare professional. [place] [address] any warranty or liability for your use of this information. Introducing [facility]! Kaykay Burgess introduces [employer] patient portal. Now you can access parts of your medical record, email your doctor's office, and request medication refills online. 1. In your internet browser, go to [email or web address] 2. Click on the First Time User? Click Here link in the Sign In box. You will see the New Member Sign Up page. 3. Enter your [employer] Access Code exactly as it appears below. You will not need to use this code after youve completed the sign-up process. If you do not sign up before the expiration date, you must request a new code. · [employer] Access Code: [email or web address] Expires: 12/6/2017  9:25 AM 
 
 4. Enter the last four digits of your Social Security Number (xxxx) and Date of Birth (mm/dd/yyyy) as indicated and click Submit. You will be taken to the next sign-up page. 5. Create a [employer] ID. This will be your [employer] login ID and cannot be changed, so think of one that is secure and easy to remember. 6. Create a [employer] password. You can change your password at any time. 7. Enter your Password Reset Question and Answer. This can be used at a later time if you forget your password. 8. Enter your e-mail address. You will receive e-mail notification when new information is available in [address] 9. Click Sign Up. You can now view and download portions of your medical record. 10. Click the Download Summary menu link to download a portable copy of your medical information. If you have questions, please visit the Frequently Asked Questions section of the [employer] website. Remember, [employer] is NOT to be used for urgent needs. For medical emergencies, dial 911. Now available from your iPhone and Android! Please provide this summary of care documentation to your next provider. Your primary care clinician is listed as Lazaro Gill. If you have any questions after today's visit, please call [phone].

## 2017-09-07 NOTE — PROGRESS NOTES
[facility]  [address], [address] [facility]  [phone] office/[phone] fax      9/7/2017    Reason for visit:   Chief Complaint   Patient presents with    Follow Up Chronic Condition       Patient: Sangeeta Greenfield, 1955, [id number]       Primary MD: Ruby Baez NP    Subjective:   Sangeeta Greenfield, a 64 y.o. female, who presents for Follow Up Chronic Condition      HPI  Cardiovascular Review:  The patient has diabetes, hypertension and hyperlipidemia. Diet and Lifestyle: generally follows a low fat low cholesterol diet, follows a diabetic diet regularly  Home BP Monitoring: is well controlled at home  Pertinent ROS: taking medications as instructed, no medication side effects noted, no TIA's, no chest pain on exertion, no dyspnea on exertion, no swelling of ankles. Diabetes Mellitus:  She has diabetes mellitus, and  hypertension and hyperlipidemia. Diabetic ROS - medication compliance: compliant all of the time, diabetic diet compliance: States at times she does not have an appetite and her sugars would drop as low as 50's. Reports this has not happened in over a month., home glucose monitoring: is performed regularly and is essentially Between 80-120s, further diabetic ROS: no polyuria or polydipsia, no chest pain, dyspnea or TIA's, no numbness, tingling or pain in extremities, remote episode of hypoglycemia, last eye exam approximately  2016   Lab review: labs are reviewed, up to date and normal.   Osteoarthritis and Chronic Pain:  Patient has osteoarthritis, primarily affecting the back. Symptoms onset: problem is longstanding. Rheumatological ROS: stable, mild-to-moderate joint symptoms intermittently, reasonably well controlled by PRN meds. Response to treatment plan: well controlled with flexeril. States she takes it at night after work. Requesting refill of flexeril   Anemia  Patient presents for presents evaluation of anemia.  Anemia was found by chronic. It has been present for several years. Associated signs & symptoms: fatigue. Past Medical History:   Diagnosis Date    Anemia     Arthritis     Back pain     [facility] [address]     Diabetes mellitus type 2, controlled ([facility] [address] 6/30/2016    Diabetic eye exam ([facility] [address] 2016    High cholesterol     Hypertension     Right arm weakness 3/3/15    pending EMG 4/22/15    Right knee DJD 1980    Cortisone shots/Mobic/ original injury    Right sciatic nerve pain 3/3/15    on Dr. 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 30 Tab 11    acetaminophen (TYLENOL ARTHRITIS PAIN) 650 mg CR tablet Take 1 Tab by mouth every eight (8) hours as needed for Pain. Indications: Arthritic Pain      Calcium Citrate-Vitamin D3 500 mg calcium -400 unit chew Take 1 Tab by mouth two (2) times a day. Indications: VITAMIN D DEFICIENCY 60 Tab 11    pitavastatin (LIVALO) 2 mg tablet Take 1 Tab by mouth daily. 90 Tab 0    ferrous sulfate 325 mg (65 mg iron) tablet Take 1 Tab by mouth Daily (before breakfast).  Indications: IRON DEFICIENCY ANEMIA 30 Tab 11    OTHER Alphabetic vitamins take as directed       No current facility-administered medications on file prior to visit. Review of Systems   Constitutional: Positive for malaise/fatigue and weight loss (5 lbs since last visit). Negative for chills and fever. HENT: Negative. Eyes: Negative. Respiratory: Negative. Cardiovascular: Negative. Gastrointestinal: Negative. Genitourinary: Negative. Musculoskeletal: Positive for back pain. Skin: Negative. Neurological: Negative. Endo/Heme/Allergies: Negative. Psychiatric/Behavioral: Negative. Objective:   Visit Vitals    /71    Pulse (!) 103    Temp 98.5 °F (36.9 °C)    Resp 20    Ht 5' 3\" (1.6 m)    Wt 195 lb (88.5 kg)    SpO2 99%    BMI 34.54 kg/m2      Wt Readings from Last 3 Encounters:   09/07/17 195 lb (88.5 kg)   06/01/17 201 lb 6.4 oz (91.4 kg)   03/02/17 203 lb 12.8 oz (92.4 kg)     Lab Results   Component Value Date/Time    Glucose 130 08/29/2017 07:14 AM    Glucose  03/03/2015 09:34 AM         Physical Exam   Constitutional: She is oriented to person, place, and time. She appears well-developed and well-nourished. HENT:   Head: Normocephalic. Eyes: Pupils are equal, round, and reactive to light. Neck: Normal range of motion. Neck supple. No JVD present. Carotid bruit is not present. Cardiovascular: Normal rate, regular rhythm, normal heart sounds and intact distal pulses. No murmur heard. Pulmonary/Chest: Effort normal and breath sounds normal. No respiratory distress. Abdominal: Soft. Bowel sounds are normal.   Musculoskeletal: Normal range of motion. Neurological: She is alert and oriented to person, place, and time. She has normal reflexes. Skin: Skin is warm and dry. Psychiatric: She has a normal mood and affect. Her behavior is normal.   Vitals reviewed.         Assessment:    Brenda Breaker who has risk factors including (see above previous medical hx) and:       ICD-10-CM ICD-9-CM    1. Controlled diabetes mellitus type 2 with complications, unspecified long term insulin use status E11.8 250.90 lisinopril (PRINIVIL, ZESTRIL) 20 mg tablet      metFORMIN (GLUCOPHAGE) 1,000 mg tablet      glipiZIDE (GLUCOTROL) 10 mg tablet      HEMOGLOBIN A1C WITH EAG      METABOLIC PANEL, COMPREHENSIVE      CBC WITH AUTOMATED DIFF      LIPID PANEL      MICROALBUMIN, UR, RAND W/ MICROALBUMIN/CREA RATIO      MAGNESIUM   2. Mild chronic anemia D64.9 285.9 CBC WITH AUTOMATED DIFF   3. Hyperlipidemia LDL goal <70 E78.5 272.4 LIPID PANEL   4. Colon cancer screening Z12.11 V76.51 OCCULT BLOOD, IMMUNOASSAY (FIT)   5. Decreased GFR R94.4 794.4 lisinopril (PRINIVIL, ZESTRIL) 20 mg tablet   6. Elevated BUN R79.9 790.6 lisinopril (PRINIVIL, ZESTRIL) 20 mg tablet   7. HTN, goal below 140/90 I10 401.9 lisinopril (PRINIVIL, ZESTRIL) 20 mg tablet   8. Spasm of lumbar paraspinous muscle M62.830 724.8 cyclobenzaprine (FLEXERIL) 10 mg tablet     1. Controlled diabetes mellitus type 2 with complications, unspecified long term insulin use status  -the patient instructed to hold glucotrol when she is having a poor appetite for this medication may drop BG levels. - lisinopril (PRINIVIL, ZESTRIL) 20 mg tablet; Take 1 Tab by mouth daily. Dispense: 30 Tab; Refill: 0  - metFORMIN (GLUCOPHAGE) 1,000 mg tablet; Take 1 Tab by mouth two (2) times daily (with meals). Dispense: 60 Tab; Refill: 4  - glipiZIDE (GLUCOTROL) 10 mg tablet; Take 1 Tab by mouth two (2) times a day. Dispense: 60 Tab; Refill: 4  - HEMOGLOBIN A1C WITH EAG; Future  - METABOLIC PANEL, COMPREHENSIVE; Future  - CBC WITH AUTOMATED DIFF; Future  - LIPID PANEL; Future  - MICROALBUMIN, UR, RAND W/ MICROALBUMIN/CREA RATIO; Future  - MAGNESIUM; Future    2. Mild chronic anemia  -the patient with c/o fatigue. Need to increase Iron pills  - CBC WITH AUTOMATED DIFF; Future    3.  Hyperlipidemia LDL goal <70  -The current medical regimen is effective;  continue present plan and medications.    - LIPID PANEL; Future    4. Colon cancer screening  - OCCULT BLOOD, IMMUNOASSAY (FIT); Future    5. Decreased GFR  -D/C prinzide and start Lisinopril  - lisinopril (PRINIVIL, ZESTRIL) 20 mg tablet; Take 1 Tab by mouth daily. Dispense: 30 Tab; Refill: 0    6. Elevated BUN  -D/C prinzide and start lisinopril  -RTO in 2 weeks for BP check  - lisinopril (PRINIVIL, ZESTRIL) 20 mg tablet; Take 1 Tab by mouth daily. Dispense: 30 Tab; Refill: 0    7. HTN, goal below 140/90  - lisinopril (PRINIVIL, ZESTRIL) 20 mg tablet; Take 1 Tab by mouth daily. Dispense: 30 Tab; Refill: 0    8. Spasm of lumbar paraspinous muscle  - cyclobenzaprine (FLEXERIL) 10 mg tablet; Take 1 Tab by mouth nightly. Indications: MUSCLE SPASM  Dispense: 30 Tab; Refill: 2      Written instructions followed our verbal discussion of all information discussed above, pending tests ordered and future goals/plans. Patient expressed understanding of current diagnosis, planned testing, follow up and if needed to contact the office for any questions or concerns prior to the next visit. Plan:   Med reconciliation completed with patient. Reviewed side effects of medications with the patient. Questions were answered and patient verb understanding. specific diabetic recommendations: home glucose monitoring emphasized, all medications, side effects and compliance discussed carefully, foot care discussed and Podiatry visits discussed, annual eye examinations at Ophthalmology discussed, glycohemoglobin and other lab monitoring discussed and long term diabetic complications discussed      Discussed lab results with patient    Displays most recent values of common labs: H&H, WBC, Platelets, ALT, AST, BUN, Creat, Na, K, TSH, HgbA1c, Lipids, INR and/or PSA. For additional labs, please use Results Review or Flowsheets.     Lab Results   Component Value Date/Time    WBC 8.6 08/29/2017 07:14 AM    HGB 10.4 08/29/2017 07:14 AM    HCT 33.8 08/29/2017 07:14 AM    PLATELET 916 86/28/0708 07:14 AM       Lab Results   Component Value Date/Time    ALT (SGPT) 21 08/29/2017 07:14 AM    AST (SGOT) 20 08/29/2017 07:14 AM    Creatinine 1.36 08/29/2017 07:14 AM    BUN 26 08/29/2017 07:14 AM    Sodium 140 08/29/2017 07:14 AM    Potassium 4.1 08/29/2017 07:14 AM       Lab Results   Component Value Date/Time    Cholesterol, total 137 08/29/2017 07:14 AM    HDL Cholesterol 72 08/29/2017 07:14 AM    LDL, calculated 46.8 08/29/2017 07:14 AM    Triglyceride 91 08/29/2017 07:14 AM    TSH 0.56 02/16/2015 09:19 AM    Hemoglobin A1c 6.9 08/29/2017 07:14 AM    INR 0.9 10/29/2014 09:21 AM       No results found for: PSAPOC, PSA3, PSAFLT, PSALT, PSA, PSA2, PSAR1    Orders Placed This Encounter    OCCULT BLOOD, IMMUNOASSAY (FIT)     Standing Status:   Future     Standing Expiration Date:   9/8/2018     Order Specific Question:   QUEST SOURCE     Answer:   Stool [1161]    HEMOGLOBIN A1C WITH EAG     Standing Status:   Future     Standing Expiration Date:   8/65/4590    METABOLIC PANEL, COMPREHENSIVE     Standing Status:   Future     Standing Expiration Date:   5/15/2018    CBC WITH AUTOMATED DIFF     Standing Status:   Future     Standing Expiration Date:   5/15/2018    LIPID PANEL     Standing Status:   Future     Standing Expiration Date:   5/15/2018    MICROALBUMIN, UR, RAND W/ MICROALBUMIN/CREA RATIO     Standing Status:   Future     Standing Expiration Date:   5/15/2018    MAGNESIUM     Standing Status:   Future     Standing Expiration Date:   5/15/2018    lisinopril (PRINIVIL, ZESTRIL) 20 mg tablet     Sig: Take 1 Tab by mouth daily. Dispense:  30 Tab     Refill:  0    metFORMIN (GLUCOPHAGE) 1,000 mg tablet     Sig: Take 1 Tab by mouth two (2) times daily (with meals). Dispense:  60 Tab     Refill:  4    glipiZIDE (GLUCOTROL) 10 mg tablet     Sig: Take 1 Tab by mouth two (2) times a day.      Dispense:  60 Tab     Refill: 4    cyclobenzaprine (FLEXERIL) 10 mg tablet     Sig: Take 1 Tab by mouth nightly. Indications: MUSCLE SPASM     Dispense:  30 Tab     Refill:  2     Current Outpatient Prescriptions   Medication Sig Dispense Refill    lisinopril (PRINIVIL, ZESTRIL) 20 mg tablet Take 1 Tab by mouth daily. 30 Tab 0    metFORMIN (GLUCOPHAGE) 1,000 mg tablet Take 1 Tab by mouth two (2) times daily (with meals). 60 Tab 4    glipiZIDE (GLUCOTROL) 10 mg tablet Take 1 Tab by mouth two (2) times a day. 60 Tab 4    cyclobenzaprine (FLEXERIL) 10 mg tablet Take 1 Tab by mouth nightly. Indications: MUSCLE SPASM 30 Tab 2    magnesium oxide (MAG-OX) 400 mg tablet Take 1 Tab by mouth daily. 30 Tab 11    acetaminophen (TYLENOL ARTHRITIS PAIN) 650 mg CR tablet Take 1 Tab by mouth every eight (8) hours as needed for Pain. Indications: Arthritic Pain      Calcium Citrate-Vitamin D3 500 mg calcium -400 unit chew Take 1 Tab by mouth two (2) times a day. Indications: VITAMIN D DEFICIENCY 60 Tab 11    pitavastatin (LIVALO) 2 mg tablet Take 1 Tab by mouth daily. 90 Tab 0    ferrous sulfate 325 mg (65 mg iron) tablet Take 1 Tab by mouth Daily (before breakfast). Indications: IRON DEFICIENCY ANEMIA 30 Tab 11    OTHER Alphabetic vitamins take as directed       Medications Discontinued During This Encounter   Medication Reason    naproxen (NAPROSYN) 500 mg tablet Not A Current Medication    lisinopril-hydroCHLOROthiazide (PRINZIDE, ZESTORETIC) 20-12.5 mg per tablet Alternate Therapy    metFORMIN (GLUCOPHAGE) 1,000 mg tablet Reorder    glipiZIDE (GLUCOTROL) 10 mg tablet Reorder    cyclobenzaprine (FLEXERIL) 10 mg tablet Reorder       Follow-up Disposition:  Return in about 2 weeks (around 9/21/2017), or if symptoms worsen or fail to improve, for BP check. rTO in 4 months for routine check and pap     Labs needed for follow-up appt      Charlene Gomez July, [address] I spent 35 minutes with the patient in face-to-face consultation, of which greater than 50% was spent in counseling and coordination of care as described above.

## 2017-09-21 ENCOUNTER — OFFICE VISIT (OUTPATIENT)
Dept: FAMILY MEDICINE CLINIC | Age: 62
End: 2017-09-21

## 2017-09-21 VITALS
RESPIRATION RATE: 16 BRPM | SYSTOLIC BLOOD PRESSURE: 115 MMHG | OXYGEN SATURATION: 98 % | DIASTOLIC BLOOD PRESSURE: 79 MMHG | HEART RATE: 93 BPM

## 2017-09-21 DIAGNOSIS — Z01.30 BLOOD PRESSURE CHECK: Primary | ICD-10-CM

## 2017-09-21 DIAGNOSIS — R79.9 ELEVATED BUN: ICD-10-CM

## 2017-09-21 DIAGNOSIS — E11.8 CONTROLLED DIABETES MELLITUS TYPE 2 WITH COMPLICATIONS, UNSPECIFIED LONG TERM INSULIN USE STATUS: ICD-10-CM

## 2017-09-21 DIAGNOSIS — I10 HTN, GOAL BELOW 140/90: ICD-10-CM

## 2017-09-21 DIAGNOSIS — R94.4 DECREASED GFR: ICD-10-CM

## 2017-09-21 RX ORDER — LISINOPRIL 20 MG/1
20 TABLET ORAL DAILY
Qty: 30 TAB | Refills: 3 | Status: SHIPPED | OUTPATIENT
Start: 2017-09-21 | End: 2018-01-15 | Stop reason: SDUPTHER

## 2017-09-21 NOTE — PROGRESS NOTES
Rosa Disla is a 64 y.o. female is here for blood pressure check. She reports she obtained the lisinopril and she is taking medications daily as prescribed. She reports she does not miss doses. The importance of not skipping doses reinforced today if they want to avoid complications like MI,CVA, renal disease and circulation problems. She is following a low sodium diet and states she does not eat pork. She does not smoke.

## 2017-11-09 ENCOUNTER — TELEPHONE (OUTPATIENT)
Dept: FAMILY MEDICINE CLINIC | Age: 62
End: 2017-11-09

## 2017-11-09 NOTE — TELEPHONE ENCOUNTER
Medication: Livalo , dose: 2 mg, how often: Daily , current number of medication days provided: 90, refill per application. Lot #: [id number], EXP 05/2018. This medication was received and verified for the following 1. Correct Patient, 2. Correct Diagnosis, 3. Correct Drug, 4. Correct route, and no current allergy to medication. Please contact patient to come  their medications.      MALIA Park, RN, [facility]

## 2017-11-13 ENCOUNTER — OFFICE VISIT (OUTPATIENT)
Dept: FAMILY MEDICINE CLINIC | Age: 62
End: 2017-11-13

## 2017-11-13 ENCOUNTER — HOSPITAL ENCOUNTER (OUTPATIENT)
Dept: LAB | Age: 62
Discharge: HOME | End: 2017-11-13

## 2017-11-13 VITALS
TEMPERATURE: 98.3 F | WEIGHT: 200 LBS | SYSTOLIC BLOOD PRESSURE: 145 MMHG | OXYGEN SATURATION: 97 % | DIASTOLIC BLOOD PRESSURE: 88 MMHG | HEART RATE: 109 BPM | HEIGHT: 63 IN | BODY MASS INDEX: 35.44 KG/M2 | RESPIRATION RATE: 16 BRPM

## 2017-11-13 DIAGNOSIS — R10.2 VAGINAL PAIN: ICD-10-CM

## 2017-11-13 DIAGNOSIS — N81.0 URETHROCELE, FEMALE: ICD-10-CM

## 2017-11-13 DIAGNOSIS — N89.8 VAGINAL LESION: ICD-10-CM

## 2017-11-13 DIAGNOSIS — Z01.419 WOMEN'S ANNUAL ROUTINE GYNECOLOGICAL EXAMINATION: ICD-10-CM

## 2017-11-13 DIAGNOSIS — R19.5 OCCULT BLOOD POSITIVE STOOL: ICD-10-CM

## 2017-11-13 DIAGNOSIS — Z01.419 WOMEN'S ANNUAL ROUTINE GYNECOLOGICAL EXAMINATION: Primary | ICD-10-CM

## 2017-11-13 DIAGNOSIS — Z12.39 BREAST CANCER SCREENING: ICD-10-CM

## 2017-11-13 LAB
APPEARANCE UR: CLEAR
BACTERIA URNS QL MICRO: ABNORMAL /HPF
BILIRUB UR QL: NEGATIVE
COLOR UR: YELLOW
EPITH CASTS URNS QL MICRO: ABNORMAL /LPF (ref 0–5)
GLUCOSE UR STRIP.AUTO-MCNC: NEGATIVE MG/DL
HEMOCCULT STL QL: POSITIVE
HGB UR QL STRIP: NEGATIVE
KETONES UR QL STRIP.AUTO: NEGATIVE MG/DL
LEUKOCYTE ESTERASE UR QL STRIP.AUTO: ABNORMAL
NITRITE UR QL STRIP.AUTO: NEGATIVE
PH UR STRIP: 5 [PH] (ref 5–8)
PROT UR STRIP-MCNC: NEGATIVE MG/DL
SERVICE CMNT-IMP: NORMAL
SP GR UR REFRACTOMETRY: 1.01 (ref 1–1.03)
UROBILINOGEN UR QL STRIP.AUTO: 0.2 EU/DL (ref 0.2–1)
VALID INTERNAL CONTROL?: YES
WBC URNS QL MICRO: ABNORMAL /HPF (ref 0–4)
WET PREP GENITAL: NORMAL

## 2017-11-13 PROCEDURE — 87210 SMEAR WET MOUNT SALINE/INK: CPT | Performed by: NURSE PRACTITIONER

## 2017-11-13 PROCEDURE — 87255 GENET VIRUS ISOLATE HSV: CPT | Performed by: NURSE PRACTITIONER

## 2017-11-13 PROCEDURE — 88142 CYTOPATH C/V THIN LAYER: CPT | Performed by: NURSE PRACTITIONER

## 2017-11-13 PROCEDURE — 81001 URINALYSIS AUTO W/SCOPE: CPT | Performed by: NURSE PRACTITIONER

## 2017-11-13 PROCEDURE — 87491 CHLMYD TRACH DNA AMP PROBE: CPT | Performed by: NURSE PRACTITIONER

## 2017-11-13 RX ORDER — ACYCLOVIR 400 MG/1
400 TABLET ORAL 3 TIMES DAILY
Qty: 30 TAB | Refills: 0 | Status: SHIPPED | OUTPATIENT
Start: 2017-11-13 | End: 2017-11-23

## 2017-11-13 NOTE — MR AVS SNAPSHOT
Visit Information Date & Time Provider Department Dept. Phone Encounter #  
 11/13/2017  8:30 AM Candie Mart NP [address] [facility] [address] [id number] Follow-up Instructions Return if symptoms worsen or fail to improve. Your Appointments 1/4/2018  9:00 AM  
PAP/PELVIC with Candie Mart NP [address] [facility] ([facility]) Appt Note: Pap w/labs [address] [facility] [id number]  
[address] [facility] [address]  
  
    
 1/11/2018  9:30 AM  
Follow Up with Candie Mart NP [address] [facility] ([facility]) Appt Note: 4 mth follow up  
 [address] [facility] [phone] [address] [facility] [address] Upcoming Health Maintenance Date Due DTaP/Tdap/Td series (1 - Tdap) 10/16/1976 BREAST CANCER SCRN MAMMOGRAM 10/16/2005 FOBT Q 1 YEAR AGE 50-75 10/16/2005 ZOSTER VACCINE AGE 60> 8/16/2015 PAP AKA CERVICAL CYTOLOGY 7/9/2017 Influenza Age 5 to Adult 8/1/2017 Pneumococcal 19-64 Medium Risk (1 of 1 - PPSV23) 3/2/2018* EYE EXAM RETINAL OR DILATED Q1 6/1/2018* HEMOGLOBIN A1C Q6M 2/28/2018 FOOT EXAM Q1 3/2/2018 MICROALBUMIN Q1 5/22/2018 LIPID PANEL Q1 8/29/2018 *Topic was postponed. The date shown is not the original due date. Allergies as of 11/13/2017  Review Complete On: 11/13/2017 By: Joaquín Varner. Jw Johnston LPN Severity Noted Reaction Type Reactions Codeine  11/28/2012    Nausea and Vomiting Januvia [Sitagliptin]  07/09/2014   Side Effect Nausea Only, Myalgia Pcn [Penicillins]  11/28/2012    Swelling Current Immunizations  Reviewed on 3/2/2017 Name Date Influenza Vaccine (Quad) PF 3/2/2017 Influenza Vaccine Split 11/28/2012 Not reviewed this visit You Were Diagnosed With   
  
 Codes Comments Women's annual routine gynecological examination    -  Primary ICD-10-CM: Z92.070 ICD-9-CM: V72.31 Vaginal pain     ICD-10-CM: R10.2 ICD-9-CM: 625.9 Vaginal lesion     ICD-10-CM: N89.8 ICD-9-CM: 623.8 Urethrocele, female     ICD-10-CM: N81.0 ICD-9-CM: 618.03 Occult blood positive stool     ICD-10-CM: R19.5 ICD-9-CM: 792.1 Vitals BP Pulse Temp Resp Height(growth percentile) Weight(growth percentile) 145/88 (BP 1 Location: Left arm, BP Patient Position: Sitting) (!) 109 98.3 °F (36.8 °C) (Oral) 16 5' 3\" (1.6 m) 200 lb (90.7 kg) SpO2 BMI OB Status Smoking Status 97% 35.43 kg/m2 Postmenopausal Never Smoker Vitals History BMI and BSA Data Body Mass Index Body Surface Area  
 35.43 kg/m 2 2.01 m 2 Preferred Pharmacy Pharmacy Name Phone [facility] [address] [phone] Your Updated Medication List  
  
   
This list is accurate as of: 11/13/17  8:54 AM.  Always use your most recent med list.  
  
  
  
  
 acyclovir 400 mg tablet Commonly known as:  ZOVIRAX Take 1 Tab by mouth three (3) times daily for 10 days. Calcium Citrate-Vitamin D3 500 mg calcium -400 unit Chew Take 1 Tab by mouth two (2) times a day. Indications: VITAMIN D DEFICIENCY  
  
 cyclobenzaprine 10 mg tablet Commonly known as:  FLEXERIL Take 1 Tab by mouth nightly. Indications: MUSCLE SPASM  
  
 ferrous sulfate 325 mg (65 mg iron) tablet Take 1 Tab by mouth Daily (before breakfast). Indications: IRON DEFICIENCY ANEMIA  
  
 glipiZIDE 10 mg tablet Commonly known as:  Franchesca Limb Take 1 Tab by mouth two (2) times a day. lisinopril 20 mg tablet Commonly known as:  Merilynn Claridge Take 1 Tab by mouth daily. magnesium oxide 400 mg tablet Commonly known as:  MAG-OX Take 1 Tab by mouth daily. metFORMIN 1,000 mg tablet Commonly known as:  GLUCOPHAGE Take 1 Tab by mouth two (2) times daily (with meals). OTHER Alphabetic vitamins take as directed  
  
 pitavastatin calcium 2 mg tablet Commonly known as:  LIVALO Take 1 Tab by mouth daily. TYLENOL ARTHRITIS PAIN 650 mg Arturo Sprung Generic drug:  acetaminophen Take 1 Tab by mouth every eight (8) hours as needed for Pain. Indications: Arthritic Pain Prescriptions Sent to Pharmacy Refills  
 acyclovir (ZOVIRAX) 400 mg tablet 0 Sig: Take 1 Tab by mouth three (3) times daily for 10 days. Class: Normal  
 Pharmacy: [facility] [address] Ph #: [phone] Route: Oral  
  
We Performed the Following AMB POC FECAL BLOOD, OCCULT, QL 1 CARD [18746 CPT(R)] REFERRAL TO GASTROENTEROLOGY [DVW63 Custom] Follow-up Instructions Return if symptoms worsen or fail to improve. To-Do List   
 11/13/2017 Lab:  CHLAMYDIA/NEISSERIA/TRICHOMONAS AMP   
  
 11/13/2017 Pathology:  PAP, LB, RFX HPV QSDXA([id number])   
  
 11/13/2017 Microbiology:  WET PREP Referral Information Referral ID Referred By Referred To  
  
 [id number] Nina MCKEON Not Available Visits Status Start Date End Date 1 New Request 11/13/17 11/13/18 If your referral has a status of pending review or denied, additional information will be sent to support the outcome of this decision. Patient Instructions Urethrocele: Care Instructions Your Care Instructions Urethrocele is a problem with the tube that carries urine from the bladder to the outside of the body. This tube is called the urethra. When the urethra sags or presses into the vagina, it is called urethrocele or urethral prolapse. This problem happens when the pelvic muscles and tissues get weak or damaged. This may occur after a woman has children or when she gets older. Or it may happen after surgery. In most cases, urethrocele does not cause serious health problems. But it may cause you to leak urine. You may notice this when you cough, laugh, or jump. You may also have problems emptying your bladder. And you may feel pressure in your vagina and pain during sex. You may feel better if you change how you do some of your daily activities. And you can try exercises to make your pelvic muscles strong. But if these don't help, you may want to talk with your doctor about surgery. Follow-up care is a key part of your treatment and safety. Be sure to make and go to all appointments, and call your doctor if you are having problems. It's also a good idea to know your test results and keep a list of the medicines you take. How can you care for yourself at home? · Do not do activities that put pressure on your pelvic muscles. This includes heavy lifting and straining. · Do exercises to tighten and strengthen your pelvic muscles. These are called Kegel exercises. To do them: 
¨ Squeeze the muscles you use to stop urine. Your belly and thighs should not move. ¨ Hold the squeeze for 3 seconds. Then relax for 3 seconds. ¨ Start with 3 seconds. Then add 1 second each week until you are able to squeeze for 10 seconds. ¨ Repeat this 10 to 15 times. Do these exercises 3 or more times a day. · Talk with your doctor about a vaginal pessary. This is a device that you put in your vagina to support the uterus. Your doctor can teach you how and when to remove it. You will also learn how to clean it and put it back in. 
· If your doctor prescribes vaginal estrogen cream, use it exactly as prescribed. When should you call for help? Watch closely for changes in your health, and be sure to contact your doctor if: 
? · You have new urinary symptoms. These may include leaking urine, having pain when urinating, or feeling like you need to urinate often. ? · You have trouble passing stool. ? · You have pain or a feeling of fullness in your vagina. ? · You do not get better as expected. Where can you learn more? Go to http://dixon-edilson.info/. Enter C553 in the search box to learn more about \"Urethrocele: Care Instructions. \" Current as of: October 13, 2016 Content Version: 11.4 © 9788-2382 [employer]. Care instructions adapted under license by [employer] (which disclaims liability or warranty for this information). If you have questions about a medical condition or this instruction, always ask your healthcare professional. [place] [address] any warranty or liability for your use of this information. Well Visit, Women 48 to 72: Care Instructions Your Care Instructions Physical exams can help you stay healthy. Your doctor has checked your overall health and may have suggested ways to take good care of yourself. He or she also may have recommended tests. At home, you can help prevent illness with healthy eating, regular exercise, and other steps. Follow-up care is a key part of your treatment and safety. Be sure to make and go to all appointments, and call your doctor if you are having problems. It's also a good idea to know your test results and keep a list of the medicines you take. How can you care for yourself at home? · Reach and stay at a healthy weight. This will lower your risk for many problems, such as obesity, diabetes, heart disease, and high blood pressure. · Get at least 30 minutes of exercise on most days of the week. Walking is a good choice. You also may want to do other activities, such as running, swimming, cycling, or playing tennis or team sports. · Do not smoke. Smoking can make health problems worse. If you need help quitting, talk to your doctor about stop-smoking programs and medicines. These can increase your chances of quitting for good. · Protect your skin from too much sun. When you're outdoors from 10 a.m. to 4 p.m., stay in the shade or cover up with clothing and a hat with a wide brim. Wear sunglasses that block UV rays. Even when it's cloudy, put broad-spectrum sunscreen (SPF 30 or higher) on any exposed skin. · See a dentist one or two times a year for checkups and to have your teeth cleaned. · Wear a seat belt in the car. · Limit alcohol to 1 drink a day. Too much alcohol can cause health problems. Follow your doctor's advice about when to have certain tests. These tests can spot problems early. · Cholesterol. Your doctor will tell you how often to have this done based on your age, family history, or other things that can increase your risk for heart attack and stroke. · Blood pressure. Have your blood pressure checked during a routine doctor visit. Your doctor will tell you how often to check your blood pressure based on your age, your blood pressure results, and other factors. · Mammogram. Ask your doctor how often you should have a mammogram, which is an X-ray of your breasts. A mammogram can spot breast cancer before it can be felt and when it is easiest to treat. · Pap test and pelvic exam. Ask your doctor how often you should have a Pap test. You may not need to have a Pap test as often as you used to. · Vision. Have your eyes checked every year or two or as often as your doctor suggests. Some experts recommend that you have yearly exams for glaucoma and other age-related eye problems starting at age 48. · Hearing. Tell your doctor if you notice any change in your hearing. You can have tests to find out how well you hear. · Diabetes. Ask your doctor whether you should have tests for diabetes. · Colon cancer. You should begin tests for colon cancer at age 48. You may have one of several tests. Your doctor will tell you how often to have tests based on your age and risk.  Risks include whether you already had a precancerous polyp removed from your colon or whether your parents, sisters and brothers, or children have had colon cancer. · Thyroid disease. Talk to your doctor about whether to have your thyroid checked as part of a regular physical exam. Women have an increased chance of a thyroid problem. · Osteoporosis. You should begin tests for bone density at age 72. If you are younger than 72, ask your doctor whether you have factors that may increase your risk for this disease. You may want to have this test before age 72. · Heart attack and stroke risk. At least every 4 to 6 years, you should have your risk for heart attack and stroke assessed. Your doctor uses factors such as your age, blood pressure, cholesterol, and whether you smoke or have diabetes to show what your risk for a heart attack or stroke is over the next 10 years. When should you call for help? Watch closely for changes in your health, and be sure to contact your doctor if you have any problems or symptoms that concern you. Where can you learn more? Go to http://dixon-edilson.info/. Enter I313 in the search box to learn more about \"Well Visit, Women 50 to 72: Care Instructions. \" Current as of: May 12, 2017 Content Version: 11.4 © 8090-1944 [employer], [employer]. Care instructions adapted under license by [employer] (which disclaims liability or warranty for this information). If you have questions about a medical condition or this instruction, always ask your healthcare professional. [place] [address] any warranty or liability for your use of this information. Introducing [facility]! Kate Patton introduces Apps[employer]under patient portal. Now you can access parts of your medical record, email your doctor's office, and request medication refills online. 1. In your internet browser, go to [email or web address]. [place]/[employer] 2. Click on the First Time User? Click Here link in the Sign In box. You will see the New Member Sign Up page. 3. Enter your [employer] Access Code exactly as it appears below. You will not need to use this code after youve completed the sign-up process. If you do not sign up before the expiration date, you must request a new code. · [employer] Access Code: [email or web address] Expires: 12/6/2017  8:25 AM 
 
4. Enter the last four digits of your Social Security Number (xxxx) and Date of Birth (mm/dd/yyyy) as indicated and click Submit. You will be taken to the next sign-up page. 5. Create a [employer] ID. This will be your [employer] login ID and cannot be changed, so think of one that is secure and easy to remember. 6. Create a [employer] password. You can change your password at any time. 7. Enter your Password Reset Question and Answer. This can be used at a later time if you forget your password. 8. Enter your e-mail address. You will receive e-mail notification when new information is available in [address] 9. Click Sign Up. You can now view and download portions of your medical record. 10. Click the Download Summary menu link to download a portable copy of your medical information. If you have questions, please visit the Frequently Asked Questions section of the [employer] website. Remember, [employer] is NOT to be used for urgent needs. For medical emergencies, dial 911. Now available from your iPhone and Android! Please provide this summary of care documentation to your next provider. Your primary care clinician is listed as Ilya White. If you have any questions after today's visit, please call [phone].

## 2017-11-13 NOTE — PROGRESS NOTES
Chief Complaint   Patient presents with    Vaginal Pain     c/o vaginal \"pressure\" x 3 weeks with occasional pain with urination and difficulty urinating. Denies vaginal discharge       Subjective:   58 y.o. female for Well Woman Check an sick visit for Vaginal pain and burning  No LMP recorded. Patient is postmenopausal.    Social History: not sexually active. Pertinent past medical hstory: hypertension, DM. Pt reports she has not had colonoscopy within 10 years    Past Medical History:   Diagnosis Date    Anemia     Arthritis     Back pain     Diabetes ([address]     Diabetes mellitus type 2, controlled ([address] 6/30/2016    Diabetic eye exam ([address] 2016    High cholesterol     Hypertension     Right arm weakness 3/3/15    pending EMG 4/22/15    Right knee DJD 1980    Cortisone shots/Mobic/ original injury    Right sciatic nerve pain 3/3/15    on Dr. Corrie Aguilar Spider bite     brown recluse     Past Surgical History:   Procedure Laterality Date    DEBRIDE NECROTIC SKIN/ TISSUE, ABD WALL Left 2002    spider bite     HX CHOLECYSTECTOMY  1986     Family History   Problem Relation Age of Onset    Hypertension Mother     Diabetes Mother     Diabetes Sister     Diabetes Brother     Colon Cancer Father 72    Blindness Father      trauma     Social History   Substance Use Topics    Smoking status: Never Smoker    Smokeless tobacco: Never Used    Alcohol use No        ROS:  Feeling well with the exception of left vaginal pain. No dyspnea or chest pain on exertion. No abdominal pain, change in bowel habits, black or bloody stools. No urinary tract symptoms. GYN ROS: no breast pain or new or enlarging lumps on self exam, she complains of Pelvic pain and pressure, \"Feels like something is protruding out when I sit down\". Also complaining of left side vaginal pain and burning with urination and complains that friction is aggravating it. . No neurological complaints.     Objective:     Visit Vitals  /88 (BP 1 Location: Left arm, BP Patient Position: Sitting)    Pulse (!) 109    Temp 98.3 °F (36.8 °C) (Oral)    Resp 16    Ht 5' 3\" (1.6 m)    Wt 200 lb (90.7 kg)    SpO2 97%    BMI 35.43 kg/m2     The patient appears well, alert, oriented x 3, in no distress. ENT normal.  Neck supple. No adenopathy or thyromegaly. [place]. Lungs are clear, good air entry, no wheezes, rhonchi or rales. S1 and S2 normal, no murmurs, regular rate and rhythm. Abdomen soft without tenderness, guarding, mass or organomegaly. Extremities show no edema, normal peripheral pulses. Neurological is normal, no focal findings. BREAST EXAM: breasts appear normal, no suspicious masses, no skin or nipple changes or axillary nodes    PELVIC EXAM: VULVA: vulvar lesion Left labia minora, VAGINA: atrophic, PELVIC FLOOR EXAM: Urethrocele, CERVIX: normal appearing cervix without discharge or lesions, DNA probe for chlamydia and GC obtained, multiparous os, UTERUS: uterus is normal size, shape, consistency and nontender, RECTAL: guaiac positive stool obtained, external hemorrhoids non-thrombosed, PAP: Pap smear done today, DNA probe for chlamydia and GC obtained, WET MOUNT done - results: Sent to lab for interpretation, exam chaperoned by Cole Benedict LPN    Physical Exam   Genitourinary: Uterus normal.   There is lesion on the left labia. Vagina exhibits no rugosity (Atrophic). No erythema in the vagina. No signs of injury around the vagina. Cervix is parous. Rectal exam shows external hemorrhoid and guaiac positive stool. Rectal exam shows no tenderness and anal tone normal.   Genitourinary Comments: Painful lesion. Pt with small/ moderate urethrocele     Radiology Results:   CT abd/pelvis 3.2016   IMPRESSION:      There are segments of thick walled mildly dilated edematous loops of small  bowel. The appearance is similar in character to that evident on the CT scan of  July 30, 2011 although much much less severe.  Is there history of inflammatory  bowel disease? Infectious gastrointestinal disease with an organism such as  Campylobacter or Yersinia is a consideration however this usually affects the  terminal ileum. The findings would be consistent with arterial or venous  segmental GI ischemia. The areas of edematous mesentery and free fluid just  peritoneal inflammation without discrete abscess formation There is  diverticulosis without findings of diverticulitis.     Mildly attempts to further assess the previous episode of gastrointestinal  disease noted in the CT of July 30, 2011 were not successful as this patient's  electronic chart begins in 2012. Relevant notes commenting upon the 2011 episode  were not found.     Assessment/Plan:   well woman  mammogram  pap smear  counseled on breast self exam, mammography screening, STD prevention, menopause and adequate intake of calcium and vitamin D  return annually or prn    ICD-10-CM ICD-9-CM    1. Women's annual routine gynecological examination Z01.419 V72.31 PAP, LB, RFX HPV [id number])      WET PREP      AMB POC FECAL BLOOD, OCCULT, QL 1 CARD   2. Vaginal pain R10.2 625.9 CHLAMYDIA/NEISSERIA/TRICHOMONAS AMP      URINALYSIS W/ RFLX MICROSCOPIC   3. Vaginal lesion N89.8 623.8 CULTURE, HSV W/ TYPING      acyclovir (ZOVIRAX) 400 mg tablet   4. Urethrocele, female N81.0 618.03    5. Occult blood positive stool R19.5 792.1 REFERRAL TO GASTROENTEROLOGY   6. Breast cancer screening Z12.31 G48.21 REFERRAL TO PUBLIC HEALTH     Diagnoses and all orders for this visit:    1. Women's annual routine gynecological examination  -     PAP, LB, RFX HPV [id number]); Future  -     WET PREP; Future  -     AMB POC FECAL BLOOD, OCCULT, QL 1 CARD    2. Vaginal pain  -     CHLAMYDIA/NEISSERIA/TRICHOMONAS AMP; Future  -     URINALYSIS W/ RFLX MICROSCOPIC; Future    3. Vaginal lesion  -     CULTURE, HSV W/ TYPING; Future  -     acyclovir (ZOVIRAX) 400 mg tablet;  Take 1 Tab by mouth three (3) times daily for 10 days. -HSV culture sent to lab. Will treat empirically. Reviewed possible dx of HSV with patient and discussed treatment. 4. Urethrocele, female  - Discussed with patient that pelvic pressure and protruding feeling she is feeling may be contributed to her mild/moderate urethrocele. Discussed this is related to weakening of muscles. Pt has had 4 vaginal pregnancies with 9 lb babies and this along with obesity contributes to weakening. Discussed pelvic floor exercises and conservative management and wt loss. Discussed will refer for surgery if pain and discomfort continue/ get worse,     5. Occult blood positive stool  -     REFERRAL TO GASTROENTEROLOGY  - The patient overdue for Colonoscopy and previous CT of abdomen showed inflammatory bowel changes. Recommend the patient get colonoscopy. 6. Breast cancer screening  -     REFERRAL TO PUBLIC HEALTH      Follow-up Disposition:  Return if symptoms worsen or fail to improve. reviewed medications and side effects in detail.

## 2017-11-13 NOTE — LETTER
NOTIFICATION RETURN TO WORK / SCHOOL 
 
11/13/2017 8:59 AM 
 
Ms. Diana Shepard [address] [facility] [address] To Whom It May Concern: 
 
Diana Shepard is currently under the care of [address] [place], [place] [address] [facility]. She will return to work/school on: Wednesday November 15, 2018 If there are questions or concerns please have the patient contact our office.  
 
 
 
Sincerely, 
 
 
Galileo Tong NP

## 2017-11-13 NOTE — PATIENT INSTRUCTIONS
Urethrocele: Care Instructions  Your Care Instructions  Urethrocele is a problem with the tube that carries urine from the bladder to the outside of the body. This tube is called the urethra. When the urethra sags or presses into the vagina, it is called urethrocele or urethral prolapse. This problem happens when the pelvic muscles and tissues get weak or damaged. This may occur after a woman has children or when she gets older. Or it may happen after surgery. In most cases, urethrocele does not cause serious health problems. But it may cause you to leak urine. You may notice this when you cough, laugh, or jump. You may also have problems emptying your bladder. And you may feel pressure in your vagina and pain during sex. You may feel better if you change how you do some of your daily activities. And you can try exercises to make your pelvic muscles strong. But if these don't help, you may want to talk with your doctor about surgery. Follow-up care is a key part of your treatment and safety. Be sure to make and go to all appointments, and call your doctor if you are having problems. It's also a good idea to know your test results and keep a list of the medicines you take. How can you care for yourself at home? · Do not do activities that put pressure on your pelvic muscles. This includes heavy lifting and straining. · Do exercises to tighten and strengthen your pelvic muscles. These are called Kegel exercises. To do them:  ¨ Squeeze the muscles you use to stop urine. Your belly and thighs should not move. ¨ Hold the squeeze for 3 seconds. Then relax for 3 seconds. ¨ Start with 3 seconds. Then add 1 second each week until you are able to squeeze for 10 seconds. ¨ Repeat this 10 to 15 times. Do these exercises 3 or more times a day. · Talk with your doctor about a vaginal pessary. This is a device that you put in your vagina to support the uterus. Your doctor can teach you how and when to remove it. You will also learn how to clean it and put it back in.  · If your doctor prescribes vaginal estrogen cream, use it exactly as prescribed. When should you call for help? Watch closely for changes in your health, and be sure to contact your doctor if:  ? · You have new urinary symptoms. These may include leaking urine, having pain when urinating, or feeling like you need to urinate often. ? · You have trouble passing stool. ? · You have pain or a feeling of fullness in your vagina. ? · You do not get better as expected. Where can you learn more? Go to http://dixon-edilson.info/. Enter C532 in the search box to learn more about \"Urethrocele: Care Instructions. \"  Current as of: October 13, 2016  Content Version: 11.4  © 5401-7632 [employer]. Care instructions adapted under license by [employer] (which disclaims liability or warranty for this information). If you have questions about a medical condition or this instruction, always ask your healthcare professional. [place] [address] any warranty or liability for your use of this information. Well Visit, Women 48 to 72: Care Instructions  Your Care Instructions    Physical exams can help you stay healthy. Your doctor has checked your overall health and may have suggested ways to take good care of yourself. He or she also may have recommended tests. At home, you can help prevent illness with healthy eating, regular exercise, and other steps. Follow-up care is a key part of your treatment and safety. Be sure to make and go to all appointments, and call your doctor if you are having problems. It's also a good idea to know your test results and keep a list of the medicines you take. How can you care for yourself at home? · Reach and stay at a healthy weight. This will lower your risk for many problems, such as obesity, diabetes, heart disease, and high blood pressure.   · Get at least 30 minutes of exercise on most days of the week. Walking is a good choice. You also may want to do other activities, such as running, swimming, cycling, or playing tennis or team sports. · Do not smoke. Smoking can make health problems worse. If you need help quitting, talk to your doctor about stop-smoking programs and medicines. These can increase your chances of quitting for good. · Protect your skin from too much sun. When you're outdoors from 10 a.m. to 4 p.m., stay in the shade or cover up with clothing and a hat with a wide brim. Wear sunglasses that block UV rays. Even when it's cloudy, put broad-spectrum sunscreen (SPF 30 or higher) on any exposed skin. · See a dentist one or two times a year for checkups and to have your teeth cleaned. · Wear a seat belt in the car. · Limit alcohol to 1 drink a day. Too much alcohol can cause health problems. Follow your doctor's advice about when to have certain tests. These tests can spot problems early. · Cholesterol. Your doctor will tell you how often to have this done based on your age, family history, or other things that can increase your risk for heart attack and stroke. · Blood pressure. Have your blood pressure checked during a routine doctor visit. Your doctor will tell you how often to check your blood pressure based on your age, your blood pressure results, and other factors. · Mammogram. Ask your doctor how often you should have a mammogram, which is an X-ray of your breasts. A mammogram can spot breast cancer before it can be felt and when it is easiest to treat. · Pap test and pelvic exam. Ask your doctor how often you should have a Pap test. You may not need to have a Pap test as often as you used to. · Vision. Have your eyes checked every year or two or as often as your doctor suggests. Some experts recommend that you have yearly exams for glaucoma and other age-related eye problems starting at age 48. · Hearing.  Tell your doctor if you notice any change in your hearing. You can have tests to find out how well you hear. · Diabetes. Ask your doctor whether you should have tests for diabetes. · Colon cancer. You should begin tests for colon cancer at age 48. You may have one of several tests. Your doctor will tell you how often to have tests based on your age and risk. Risks include whether you already had a precancerous polyp removed from your colon or whether your parents, sisters and brothers, or children have had colon cancer. · Thyroid disease. Talk to your doctor about whether to have your thyroid checked as part of a regular physical exam. Women have an increased chance of a thyroid problem. · Osteoporosis. You should begin tests for bone density at age 72. If you are younger than 72, ask your doctor whether you have factors that may increase your risk for this disease. You may want to have this test before age 72. · Heart attack and stroke risk. At least every 4 to 6 years, you should have your risk for heart attack and stroke assessed. Your doctor uses factors such as your age, blood pressure, cholesterol, and whether you smoke or have diabetes to show what your risk for a heart attack or stroke is over the next 10 years. When should you call for help? Watch closely for changes in your health, and be sure to contact your doctor if you have any problems or symptoms that concern you. Where can you learn more? Go to http://dixon-edilson.info/. Enter K504 in the search box to learn more about \"Well Visit, Women 50 to 72: Care Instructions. \"  Current as of: May 12, 2017  Content Version: 11.4  © 8891-7316 [employer]. Care instructions adapted under license by [employer] (which disclaims liability or warranty for this information).  If you have questions about a medical condition or this instruction, always ask your healthcare professional. [place] [address] any warranty or liability for your use of this information.

## 2017-11-14 LAB
C TRACH RRNA SPEC QL NAA+PROBE: NEGATIVE
N GONORRHOEA RRNA SPEC QL NAA+PROBE: NEGATIVE
SPECIMEN SOURCE: NORMAL
T VAGINALIS RRNA SPEC QL NAA+PROBE: NEGATIVE

## 2017-11-15 LAB
HSV SPEC CULT: NORMAL
SPECIMEN SOURCE: NORMAL

## 2017-11-15 NOTE — PROGRESS NOTES
Please inform the patient that her pap and labs came back normal. Please advise the patient that she was negative for HSV but continue the treatment. May apply vaginal barrier ointment to lesion such as Vaseline to help with comfort and burning. If no improvements  Of lesion in a week would recommend referral to [facility] for possible vulvar biopsy.

## 2017-11-16 NOTE — PROGRESS NOTES
Called patient and informed her that Pap Smear and labs resulted normal.  Informed patient that HSV culture was negative but to continue the treatment and she may also apply ointment such as Vaseline to help with comfort and burning. Instructed patient to return to clinic in 1 week for referral to GYN for possible biopsy of lesion, if no improvement. Patient states \"Believe it or not, she gave me a day off and I stayed in bed and felt much better after\". Her pain has subsided and only felt slightly every now and then. She agrees to RTC prn and verified understanding of all information.

## 2017-11-22 ENCOUNTER — TELEPHONE (OUTPATIENT)
Dept: FAMILY MEDICINE CLINIC | Age: 62
End: 2017-11-22

## 2017-11-22 NOTE — TELEPHONE ENCOUNTER
Called Pt to come back by the office to  another Fit Kit. Galina Luna from [address] called the office to say that they just received pt specimen and the date was too old. Pt says she will come by the office Monday for another kit and will bring specimen back to us to be sent to the lab.

## 2017-12-03 DIAGNOSIS — M62.830 SPASM OF LUMBAR PARASPINOUS MUSCLE: ICD-10-CM

## 2017-12-04 RX ORDER — CYCLOBENZAPRINE HCL 10 MG
TABLET ORAL
Qty: 30 TAB | Refills: 2 | Status: SHIPPED | OUTPATIENT
Start: 2017-12-04 | End: 2018-02-26 | Stop reason: SDUPTHER

## 2017-12-21 ENCOUNTER — HOSPITAL ENCOUNTER (OUTPATIENT)
Dept: LAB | Age: 62
Discharge: HOME | End: 2017-12-21

## 2017-12-21 DIAGNOSIS — Z12.11 COLON CANCER SCREENING: ICD-10-CM

## 2017-12-21 PROCEDURE — 82274 ASSAY TEST FOR BLOOD FECAL: CPT | Performed by: NURSE PRACTITIONER

## 2017-12-22 LAB — HEMOCCULT STL QL IA: NEGATIVE

## 2018-01-09 ENCOUNTER — HOSPITAL ENCOUNTER (OUTPATIENT)
Dept: LAB | Age: 63
Discharge: HOME | End: 2018-01-09

## 2018-01-09 ENCOUNTER — LAB ONLY (OUTPATIENT)
Dept: FAMILY MEDICINE CLINIC | Age: 63
End: 2018-01-09

## 2018-01-09 DIAGNOSIS — E78.5 HYPERLIPIDEMIA WITH TARGET LDL LESS THAN 100: ICD-10-CM

## 2018-01-09 DIAGNOSIS — E11.8 CONTROLLED DIABETES MELLITUS TYPE 2 WITH COMPLICATIONS, UNSPECIFIED LONG TERM INSULIN USE STATUS: Primary | ICD-10-CM

## 2018-01-09 DIAGNOSIS — D64.9 MILD CHRONIC ANEMIA: ICD-10-CM

## 2018-01-09 DIAGNOSIS — E11.8 CONTROLLED DIABETES MELLITUS TYPE 2 WITH COMPLICATIONS, UNSPECIFIED LONG TERM INSULIN USE STATUS: ICD-10-CM

## 2018-01-09 DIAGNOSIS — E83.42 HYPOMAGNESEMIA: ICD-10-CM

## 2018-01-09 DIAGNOSIS — E78.5 HYPERLIPIDEMIA LDL GOAL <70: ICD-10-CM

## 2018-01-09 LAB
ALBUMIN SERPL-MCNC: 3.7 G/DL (ref 3.4–5)
ALBUMIN/GLOB SERPL: 1.1 {RATIO} (ref 0.8–1.7)
ALP SERPL-CCNC: 72 U/L (ref 45–117)
ALT SERPL-CCNC: 21 U/L (ref 13–56)
ANION GAP SERPL CALC-SCNC: 9 MMOL/L (ref 3–18)
AST SERPL-CCNC: 20 U/L (ref 15–37)
BASOPHILS # BLD: 0 K/UL (ref 0–0.06)
BASOPHILS NFR BLD: 1 % (ref 0–2)
BILIRUB SERPL-MCNC: 0.7 MG/DL (ref 0.2–1)
BUN SERPL-MCNC: 15 MG/DL (ref 7–18)
BUN/CREAT SERPL: 16 (ref 12–20)
CALCIUM SERPL-MCNC: 9.4 MG/DL (ref 8.5–10.1)
CHLORIDE SERPL-SCNC: 104 MMOL/L (ref 100–108)
CHOLEST SERPL-MCNC: 187 MG/DL
CO2 SERPL-SCNC: 29 MMOL/L (ref 21–32)
CREAT SERPL-MCNC: 0.94 MG/DL (ref 0.6–1.3)
CREAT UR-MCNC: 71.2 MG/DL (ref 30–125)
DIFFERENTIAL METHOD BLD: ABNORMAL
EOSINOPHIL # BLD: 0.1 K/UL (ref 0–0.4)
EOSINOPHIL NFR BLD: 1 % (ref 0–5)
ERYTHROCYTE [DISTWIDTH] IN BLOOD BY AUTOMATED COUNT: 13.2 % (ref 11.6–14.5)
EST. AVERAGE GLUCOSE BLD GHB EST-MCNC: 143 MG/DL
GLOBULIN SER CALC-MCNC: 3.5 G/DL (ref 2–4)
GLUCOSE SERPL-MCNC: 113 MG/DL (ref 74–99)
HBA1C MFR BLD: 6.6 % (ref 4.2–5.6)
HCT VFR BLD AUTO: 34.2 % (ref 35–45)
HDLC SERPL-MCNC: 90 MG/DL (ref 40–60)
HDLC SERPL: 2.1 {RATIO} (ref 0–5)
HGB BLD-MCNC: 10.6 G/DL (ref 12–16)
LDLC SERPL CALC-MCNC: 75.8 MG/DL (ref 0–100)
LIPID PROFILE,FLP: ABNORMAL
LYMPHOCYTES # BLD: 2 K/UL (ref 0.9–3.6)
LYMPHOCYTES NFR BLD: 31 % (ref 21–52)
MAGNESIUM SERPL-MCNC: 1.8 MG/DL (ref 1.6–2.6)
MCH RBC QN AUTO: 27.7 PG (ref 24–34)
MCHC RBC AUTO-ENTMCNC: 31 G/DL (ref 31–37)
MCV RBC AUTO: 89.3 FL (ref 74–97)
MICROALBUMIN UR-MCNC: 1.02 MG/DL (ref 0–3)
MICROALBUMIN/CREAT UR-RTO: 14 MG/G (ref 0–30)
MONOCYTES # BLD: 0.6 K/UL (ref 0.05–1.2)
MONOCYTES NFR BLD: 9 % (ref 3–10)
NEUTS SEG # BLD: 3.8 K/UL (ref 1.8–8)
NEUTS SEG NFR BLD: 58 % (ref 40–73)
PLATELET # BLD AUTO: 297 K/UL (ref 135–420)
PMV BLD AUTO: 10.4 FL (ref 9.2–11.8)
POTASSIUM SERPL-SCNC: 4.4 MMOL/L (ref 3.5–5.5)
PROT SERPL-MCNC: 7.2 G/DL (ref 6.4–8.2)
RBC # BLD AUTO: 3.83 M/UL (ref 4.2–5.3)
SODIUM SERPL-SCNC: 142 MMOL/L (ref 136–145)
TRIGL SERPL-MCNC: 106 MG/DL (ref ?–150)
VLDLC SERPL CALC-MCNC: 21.2 MG/DL
WBC # BLD AUTO: 6.5 K/UL (ref 4.6–13.2)

## 2018-01-09 PROCEDURE — 85025 COMPLETE CBC W/AUTO DIFF WBC: CPT | Performed by: NURSE PRACTITIONER

## 2018-01-09 PROCEDURE — 83735 ASSAY OF MAGNESIUM: CPT | Performed by: NURSE PRACTITIONER

## 2018-01-09 PROCEDURE — 83036 HEMOGLOBIN GLYCOSYLATED A1C: CPT | Performed by: NURSE PRACTITIONER

## 2018-01-09 PROCEDURE — 80061 LIPID PANEL: CPT | Performed by: NURSE PRACTITIONER

## 2018-01-09 PROCEDURE — 82043 UR ALBUMIN QUANTITATIVE: CPT | Performed by: NURSE PRACTITIONER

## 2018-01-09 PROCEDURE — 80053 COMPREHEN METABOLIC PANEL: CPT | Performed by: NURSE PRACTITIONER

## 2018-01-15 ENCOUNTER — OFFICE VISIT (OUTPATIENT)
Dept: FAMILY MEDICINE CLINIC | Age: 63
End: 2018-01-15

## 2018-01-15 VITALS
TEMPERATURE: 98.1 F | RESPIRATION RATE: 12 BRPM | WEIGHT: 199 LBS | HEIGHT: 63 IN | SYSTOLIC BLOOD PRESSURE: 149 MMHG | HEART RATE: 98 BPM | DIASTOLIC BLOOD PRESSURE: 87 MMHG | OXYGEN SATURATION: 100 % | BODY MASS INDEX: 35.26 KG/M2

## 2018-01-15 DIAGNOSIS — G47.00 INSOMNIA, UNSPECIFIED TYPE: ICD-10-CM

## 2018-01-15 DIAGNOSIS — E78.5 HYPERLIPIDEMIA WITH TARGET LDL LESS THAN 100: ICD-10-CM

## 2018-01-15 DIAGNOSIS — E11.8 CONTROLLED DIABETES MELLITUS TYPE 2 WITH COMPLICATIONS, UNSPECIFIED LONG TERM INSULIN USE STATUS: ICD-10-CM

## 2018-01-15 DIAGNOSIS — D64.9 MILD CHRONIC ANEMIA: ICD-10-CM

## 2018-01-15 DIAGNOSIS — R94.4 DECREASED GFR: ICD-10-CM

## 2018-01-15 DIAGNOSIS — N81.0 URETHROCELE, FEMALE: ICD-10-CM

## 2018-01-15 DIAGNOSIS — R79.9 ELEVATED BUN: ICD-10-CM

## 2018-01-15 DIAGNOSIS — I10 HTN, GOAL BELOW 140/90: ICD-10-CM

## 2018-01-15 DIAGNOSIS — E11.8 CONTROLLED DIABETES MELLITUS TYPE 2 WITH COMPLICATIONS, UNSPECIFIED LONG TERM INSULIN USE STATUS: Primary | ICD-10-CM

## 2018-01-15 RX ORDER — LISINOPRIL 20 MG/1
20 TABLET ORAL DAILY
Qty: 30 TAB | Refills: 3 | Status: SHIPPED | OUTPATIENT
Start: 2018-01-15 | End: 2018-06-11 | Stop reason: SDUPTHER

## 2018-01-15 RX ORDER — METFORMIN HYDROCHLORIDE 1000 MG/1
1000 TABLET ORAL 2 TIMES DAILY WITH MEALS
Qty: 60 TAB | Refills: 4 | Status: SHIPPED | OUTPATIENT
Start: 2018-01-15 | End: 2018-06-11 | Stop reason: SDUPTHER

## 2018-01-15 RX ORDER — GLIPIZIDE 10 MG/1
10 TABLET ORAL 2 TIMES DAILY
Qty: 60 TAB | Refills: 4 | Status: SHIPPED | OUTPATIENT
Start: 2018-01-15 | End: 2018-06-11 | Stop reason: SDUPTHER

## 2018-01-15 NOTE — LETTER
1/15/2018 [facility] [address], [address] Rashawn Fu, 1955, is picking up the following medications ordered from the [facility]: LIVALO 2 MG #90 Doc Garcia Patient's Signature: _____________________________ Today's Date: 1/15/2018

## 2018-01-15 NOTE — PATIENT INSTRUCTIONS

## 2018-02-01 ENCOUNTER — ANESTHESIA EVENT (OUTPATIENT)
Dept: ENDOSCOPY | Age: 63
End: 2018-02-01
Payer: SELF-PAY

## 2018-02-02 ENCOUNTER — HOSPITAL ENCOUNTER (OUTPATIENT)
Age: 63
Setting detail: OUTPATIENT SURGERY
Discharge: HOME | End: 2018-02-02
Attending: INTERNAL MEDICINE | Admitting: INTERNAL MEDICINE
Payer: SELF-PAY

## 2018-02-02 ENCOUNTER — ANESTHESIA (OUTPATIENT)
Dept: ENDOSCOPY | Age: 63
End: 2018-02-02
Payer: SELF-PAY

## 2018-02-02 VITALS
BODY MASS INDEX: 35.26 KG/M2 | RESPIRATION RATE: 20 BRPM | OXYGEN SATURATION: 100 % | HEART RATE: 100 BPM | DIASTOLIC BLOOD PRESSURE: 67 MMHG | SYSTOLIC BLOOD PRESSURE: 107 MMHG | HEIGHT: 63 IN | WEIGHT: 199 LBS | TEMPERATURE: 98.4 F

## 2018-02-02 LAB
GLUCOSE BLD STRIP.AUTO-MCNC: 125 MG/DL (ref 70–110)
GLUCOSE BLD STRIP.AUTO-MCNC: 146 MG/DL (ref 70–110)

## 2018-02-02 PROCEDURE — 74011000250 HC RX REV CODE- 250: Performed by: NURSE ANESTHETIST, CERTIFIED REGISTERED

## 2018-02-02 PROCEDURE — 74011250636 HC RX REV CODE- 250/636: Performed by: NURSE ANESTHETIST, CERTIFIED REGISTERED

## 2018-02-02 PROCEDURE — 77030008565 HC TBNG SUC IRR ERBE -B: Performed by: INTERNAL MEDICINE

## 2018-02-02 PROCEDURE — 74011250636 HC RX REV CODE- 250/636

## 2018-02-02 PROCEDURE — 74011000250 HC RX REV CODE- 250

## 2018-02-02 PROCEDURE — 77030013992 HC SNR POLYP ENDOSC BSC -B: Performed by: INTERNAL MEDICINE

## 2018-02-02 PROCEDURE — 76060000031 HC ANESTHESIA FIRST 0.5 HR: Performed by: INTERNAL MEDICINE

## 2018-02-02 PROCEDURE — 88305 TISSUE EXAM BY PATHOLOGIST: CPT | Performed by: INTERNAL MEDICINE

## 2018-02-02 PROCEDURE — 82962 GLUCOSE BLOOD TEST: CPT

## 2018-02-02 PROCEDURE — 76040000019: Performed by: INTERNAL MEDICINE

## 2018-02-02 PROCEDURE — 77030018846 HC SOL IRR STRL H20 ICUM -A: Performed by: INTERNAL MEDICINE

## 2018-02-02 RX ORDER — SODIUM CHLORIDE, SODIUM LACTATE, POTASSIUM CHLORIDE, CALCIUM CHLORIDE 600; 310; 30; 20 MG/100ML; MG/100ML; MG/100ML; MG/100ML
INJECTION, SOLUTION INTRAVENOUS
Status: DISCONTINUED | OUTPATIENT
Start: 2018-02-02 | End: 2018-02-02 | Stop reason: HOSPADM

## 2018-02-02 RX ORDER — ONDANSETRON 2 MG/ML
INJECTION INTRAMUSCULAR; INTRAVENOUS
Status: COMPLETED
Start: 2018-02-02 | End: 2018-02-02

## 2018-02-02 RX ORDER — PROPOFOL 10 MG/ML
INJECTION, EMULSION INTRAVENOUS AS NEEDED
Status: DISCONTINUED | OUTPATIENT
Start: 2018-02-02 | End: 2018-02-02 | Stop reason: HOSPADM

## 2018-02-02 RX ORDER — ONDANSETRON 2 MG/ML
4 INJECTION INTRAMUSCULAR; INTRAVENOUS ONCE
Status: COMPLETED | OUTPATIENT
Start: 2018-02-02 | End: 2018-02-02

## 2018-02-02 RX ORDER — LIDOCAINE HYDROCHLORIDE 10 MG/ML
0.1 INJECTION, SOLUTION EPIDURAL; INFILTRATION; INTRACAUDAL; PERINEURAL AS NEEDED
Status: DISCONTINUED | OUTPATIENT
Start: 2018-02-02 | End: 2018-02-02 | Stop reason: HOSPADM

## 2018-02-02 RX ORDER — SODIUM CHLORIDE, SODIUM LACTATE, POTASSIUM CHLORIDE, CALCIUM CHLORIDE 600; 310; 30; 20 MG/100ML; MG/100ML; MG/100ML; MG/100ML
50 INJECTION, SOLUTION INTRAVENOUS CONTINUOUS
Status: DISCONTINUED | OUTPATIENT
Start: 2018-02-02 | End: 2018-02-02 | Stop reason: HOSPADM

## 2018-02-02 RX ORDER — SODIUM CHLORIDE 0.9 % (FLUSH) 0.9 %
5-10 SYRINGE (ML) INJECTION AS NEEDED
Status: DISCONTINUED | OUTPATIENT
Start: 2018-02-02 | End: 2018-02-02 | Stop reason: HOSPADM

## 2018-02-02 RX ORDER — LIDOCAINE HYDROCHLORIDE 20 MG/ML
INJECTION, SOLUTION EPIDURAL; INFILTRATION; INTRACAUDAL; PERINEURAL AS NEEDED
Status: DISCONTINUED | OUTPATIENT
Start: 2018-02-02 | End: 2018-02-02 | Stop reason: HOSPADM

## 2018-02-02 RX ORDER — INSULIN LISPRO 100 [IU]/ML
INJECTION, SOLUTION INTRAVENOUS; SUBCUTANEOUS ONCE
Status: DISCONTINUED | OUTPATIENT
Start: 2018-02-02 | End: 2018-02-02 | Stop reason: HOSPADM

## 2018-02-02 RX ADMIN — PROPOFOL 50 MG: 10 INJECTION, EMULSION INTRAVENOUS at 10:35

## 2018-02-02 RX ADMIN — PROPOFOL 50 MG: 10 INJECTION, EMULSION INTRAVENOUS at 10:40

## 2018-02-02 RX ADMIN — PROPOFOL 100 MG: 10 INJECTION, EMULSION INTRAVENOUS at 10:45

## 2018-02-02 RX ADMIN — LIDOCAINE HYDROCHLORIDE 20 MG: 20 INJECTION, SOLUTION EPIDURAL; INFILTRATION; INTRACAUDAL; PERINEURAL at 10:35

## 2018-02-02 RX ADMIN — SODIUM CHLORIDE, SODIUM LACTATE, POTASSIUM CHLORIDE, AND CALCIUM CHLORIDE 50 ML/HR: 600; 310; 30; 20 INJECTION, SOLUTION INTRAVENOUS at 10:15

## 2018-02-02 RX ADMIN — SODIUM CHLORIDE, SODIUM LACTATE, POTASSIUM CHLORIDE, CALCIUM CHLORIDE: 600; 310; 30; 20 INJECTION, SOLUTION INTRAVENOUS at 10:30

## 2018-02-02 RX ADMIN — ONDANSETRON 4 MG: 2 INJECTION INTRAMUSCULAR; INTRAVENOUS at 11:10

## 2018-02-02 RX ADMIN — FAMOTIDINE 20 MG: 10 INJECTION, SOLUTION INTRAVENOUS at 10:15

## 2018-02-02 NOTE — ANESTHESIA PREPROCEDURE EVALUATION
Anesthetic History   No history of anesthetic complications            Review of Systems / Medical History  Patient summary reviewed and pertinent labs reviewed    Pulmonary  Within defined limits                 Neuro/Psych   Within defined limits           Cardiovascular    Hypertension: well controlled              Exercise tolerance: >4 METS     GI/Hepatic/Renal     GERD: well controlled           Endo/Other    Diabetes: well controlled, type 2    Morbid obesity and arthritis     Other Findings   Comments: Documentation of current medication  Current medications obtained, documented and obtained? YES      Risk Factors for Postoperative nausea/vomiting:       History of postoperative nausea/vomiting? NO       Female? YES       Motion sickness? NO       Intended opioid administration for postoperative analgesia? YES      Smoking Abstinence:  Current Smoker? NO  Elective Surgery? YES  Seen preoperatively by anesthesiologist or proxy prior to day of surgery? YES  Pt abstained from smoking 24 hours prior to anesthesia?  N/A    Preventive care/screening for High Blood Pressure:  Aged 18 years and older: YES  Screened for high blood pressure: YES  Patients with high blood pressure referred to primary care provider   for BP management: YES                 Physical Exam    Airway  Mallampati: III  TM Distance: 4 - 6 cm  Neck ROM: normal range of motion   Mouth opening: Normal     Cardiovascular    Rhythm: regular  Rate: normal         Dental  No notable dental hx       Pulmonary  Breath sounds clear to auscultation               Abdominal  GI exam deferred       Other Findings            Anesthetic Plan    ASA: 3  Anesthesia type: MAC            Anesthetic plan and risks discussed with: Patient

## 2018-02-02 NOTE — PERIOP NOTES
I have reviewed discharge instructions with the patient and daughter, Marywade Cedillo. The patient and daughter verbalized understanding. Patient armband removed and shredded.

## 2018-02-02 NOTE — IP AVS SNAPSHOT
[address] [facility] [address] 
 
 
 [address] [facility] 61 [facility] Patient: Shannan Chopra MRN: [id number] :1955 About your hospitalization You were admitted on:  2018 You last received care in the:  [facility] PHASE 2 RECOVERY You were discharged on:  2018 Why you were hospitalized Your primary diagnosis was:  Not on File Follow-up Information Follow up With Details Comments Contact Info Leo Gonzalez NP   [address] [facility] [phone] Edu English MD  Colonoscopy in 5 years. [address] [place] [address] [address] [facility] 
[phone] Discharge Orders None A check deonte indicates which time of day the medication should be taken. My Medications CONTINUE taking these medications Instructions Each Dose to Equal  
 Morning Noon Evening Bedtime Calcium Citrate-Vitamin D3 500 mg calcium -400 unit Chew Your last dose was: Your next dose is: Take 1 Tab by mouth two (2) times a day. Indications: VITAMIN D DEFICIENCY  
 1 Tab  
    
   
   
   
  
 cyclobenzaprine 10 mg tablet Commonly known as:  FLEXERIL Your last dose was: Your next dose is: TAKE ONE TABLET BY MOUTH NIGHTLY FOR MUSCLE SPASM  
     
   
   
   
  
 ferrous sulfate 325 mg (65 mg iron) tablet Your last dose was: Your next dose is: Take 1 Tab by mouth Daily (before breakfast). Indications: IRON DEFICIENCY ANEMIA  
 325 mg  
    
   
   
   
  
 glipiZIDE 10 mg tablet Commonly known as:  Monalisa Aden Your last dose was: Your next dose is: Take 1 Tab by mouth two (2) times a day. 10 mg  
    
   
   
   
  
 lisinopril 20 mg tablet Commonly known as:  Yannick Flores Your last dose was: Your next dose is: Take 1 Tab by mouth daily.   
 20 mg  
    
 magnesium oxide 400 mg tablet Commonly known as:  MAG-OX Your last dose was: Your next dose is: Take 1 Tab by mouth daily. 400 mg  
    
   
   
   
  
 metFORMIN 1,000 mg tablet Commonly known as:  GLUCOPHAGE Your last dose was: Your next dose is: Take 1 Tab by mouth two (2) times daily (with meals). 1000 mg  
    
   
   
   
  
 OTHER Your last dose was: Your next dose is:    
   
   
 Alphabetic vitamins take as directed  
     
   
   
   
  
 pitavastatin calcium 2 mg tablet Commonly known as:  LIVALO Your last dose was: Your next dose is: Take 1 Tab by mouth daily. 2 mg TYLENOL ARTHRITIS PAIN 650 mg Rylee Essex Generic drug:  acetaminophen Your last dose was: Your next dose is: Take 1 Tab by mouth every eight (8) hours as needed for Pain. Indications: Arthritic Pain 650 mg Discharge Instructions Colonoscopy: What to Expect at [facility] Your Recovery After you have a colonoscopy, you will stay at the clinic for 1 to 2 hours until the medicines wear off. Then you can go home. But you will need to arrange for a ride. Your doctor will tell you when you can eat and do your other usual activities. Your doctor will talk to you about when you will need your next colonoscopy. Your doctor can help you decide how often you need to be checked. This will depend on the results of your test and your risk for colorectal cancer. After the test, you may be bloated or have gas pains. You may need to pass gas. If a biopsy was done or a polyp was removed, you may have streaks of blood in your stool (feces) for a few days. This care sheet gives you a general idea about how long it will take for you to recover. But each person recovers at a different pace. Follow the steps below to get better as quickly as possible. How can you care for yourself at home? Activity ? · Rest when you feel tired. ? · You can do your normal activities when it feels okay to do so. Diet ? · Follow your doctor's directions for eating. ? · Unless your doctor has told you not to, drink plenty of fluids. This helps to replace the fluids that were lost during the colon prep. ? · Do not drink alcohol. Medicines ? · Your doctor will tell you if and when you can restart your medicines. He or she will also give you instructions about taking any new medicines. ? · If you take blood thinners, such as warfarin (Coumadin), clopidogrel (Plavix), or aspirin, be sure to talk to your doctor. He or she will tell you if and when to start taking those medicines again. Make sure that you understand exactly what your doctor wants you to do. ? · If polyps were removed or a biopsy was done during the test, your doctor may tell you not to take aspirin or other anti-inflammatory medicines for a few days. These include ibuprofen (Advil, Motrin) and naproxen (Aleve). Other instructions ? · For your safety, do not drive or operate machinery until the medicine wears off and you can think clearly. Your doctor may tell you not to drive or operate machinery until the day after your test.  
? · Do not sign legal documents or make major decisions until the medicine wears off and you can think clearly. The anesthesia can make it hard for you to fully understand what you are agreeing to. Follow-up care is a key part of your treatment and safety. Be sure to make and go to all appointments, and call your doctor if you are having problems. It's also a good idea to know your test results and keep a list of the medicines you take. When should you call for help? Call 911 anytime you think you may need emergency care. For example, call if: 
? · You passed out (lost consciousness). ? · You pass maroon or bloody stools. ? · You have trouble breathing. ?Call your doctor now or seek immediate medical care if: 
? · You have pain that does not get better after you take pain medicine. ? · You are sick to your stomach or cannot drink fluids. ? · You have new or worse belly pain. ? · You have blood in your stools. ? · You have a fever. ? · You cannot pass stools or gas. ? Watch closely for changes in your health, and be sure to contact your doctor if you have any problems. Where can you learn more? Go to http://dixon-edilson.info/. Enter E264 in the search box to learn more about \"Colonoscopy: What to Expect at Home. \" Current as of: May 12, 2017 Content Version: 11.4 © 1259-0775 [employer]. Care instructions adapted under license by [employer] (which disclaims liability or warranty for this information). If you have questions about a medical condition or this instruction, always ask your healthcare professional. [place] [address] any warranty or liability for your use of this information. Colon Polyps: Care Instructions Your Care Instructions Colon polyps are growths in the colon or the rectum. The cause of most colon polyps is not known, and most people who get them do not have any problems. But a certain kind can turn into cancer. For this reason, regular testing for colon polyps is important for people age 48 and older and anyone who has an increased risk for colon cancer. Polyps are usually found through routine colon cancer screening tests. Although most colon polyps are not cancerous, they are usually removed and then tested for cancer. Screening for colon cancer saves lives because the cancer can usually be cured if it is caught early. If you have a polyp that is the type that can turn into cancer, you may need more tests to examine your entire colon. The doctor will remove any other polyps that he or she finds, and you will be tested more often. Follow-up care is a key part of your treatment and safety. Be sure to make and go to all appointments, and call your doctor if you are having problems. It's also a good idea to know your test results and keep a list of the medicines you take. How can you care for yourself at home? Regular exams to look for colon polyps are the best way to prevent polyps from turning into colon cancer. These can include stool tests, sigmoidoscopy, colonoscopy, and CT colonography. Talk with your doctor about a testing schedule that is right for you. To prevent polyps There is no home treatment that can prevent colon polyps. But these steps may help lower your risk for cancer. · Stay active. Being active can help you get to and stay at a healthy weight. Try to exercise on most days of the week. Walking is a good choice. · Eat well. Choose a variety of vegetables, fruits, legumes (such as peas and beans), fish, poultry, and whole grains. · Do not smoke. If you need help quitting, talk to your doctor about stop-smoking programs and medicines. These can increase your chances of quitting for good. · If you drink alcohol, limit how much you drink. Limit alcohol to 2 drinks a day for men and 1 drink a day for women. When should you call for help? Call your doctor now or seek immediate medical care if: 
? · You have severe belly pain. ? · Your stools are maroon or very bloody. ? Watch closely for changes in your health, and be sure to contact your doctor if: 
? · You have a fever. ? · You have nausea or vomiting. ? · You have a change in bowel habits (new constipation or diarrhea). ? · Your symptoms get worse or are not improving as expected. Where can you learn more? Go to http://dixon-edilson.info/. Enter 95 [id number] in the search box to learn more about \"Colon Polyps: Care Instructions. \" Current as of: May 12, 2017 Content Version: 11.4 © 3822-2733 [employer]. Care instructions adapted under license by [employer] (which disclaims liability or warranty for this information). If you have questions about a medical condition or this instruction, always ask your healthcare professional. [address] any warranty or liability for your use of this information. Diverticulosis: Care Instructions Your Care Instructions In diverticulosis, pouches called diverticula form in the wall of the large intestine (colon). The pouches do not cause any pain or other symptoms. Most people who have diverticulosis do not know they have it. But the pouches sometimes bleed, and if they become infected, they can cause pain and other symptoms. When this happens, it is called diverticulitis. Diverticula form when pressure pushes the wall of the colon outward at certain weak points. A diet that is too low in fiber can cause diverticula. Follow-up care is a key part of your treatment and safety. Be sure to make and go to all appointments, and call your doctor if you are having problems. It's also a good idea to know your test results and keep a list of the medicines you take. How can you care for yourself at home? · Include fruits, leafy green vegetables, beans, and whole grains in your diet each day. These foods are high in fiber. · Take a fiber supplement, such as Citrucel or Metamucil, every day if needed. Read and follow all instructions on the label. · Drink plenty of fluids, enough so that your urine is light yellow or clear like water. If you have kidney, heart, or liver disease and have to limit fluids, talk with your doctor before you increase the amount of fluids you drink. · Get at least 30 minutes of exercise on most days of the week. Walking is a good choice. You also may want to do other activities, such as running, swimming, cycling, or playing tennis or team sports. · Cut out foods that cause gas, pain, or other symptoms. When should you call for help? Call your doctor now or seek immediate medical care if: 
? · You have belly pain. ? · You pass maroon or very bloody stools. ? · You have a fever. ? · You have nausea and vomiting. ? · You have unusual changes in your bowel movements or abdominal swelling. ? · You have burning pain when you urinate. ? · You have abnormal vaginal discharge. ? · You have shoulder pain. ? · You have cramping pain that does not get better when you have a bowel movement or pass gas. ? · You pass gas or stool from your urethra while urinating. ? Watch closely for changes in your health, and be sure to contact your doctor if you have any problems. Where can you learn more? Go to http://dixon-edilson.info/. Enter S554 in the search box to learn more about \"Diverticulosis: Care Instructions. \" Current as of: May 12, 2017 Content Version: 11.4 © 8243-6363 "[employer]". Care instructions adapted under license by [employer] (which disclaims liability or warranty for this information). If you have questions about a medical condition or this instruction, always ask your healthcare professional. [place] [address] any warranty or liability for your use of this information. DISCHARGE SUMMARY from Nurse PATIENT INSTRUCTIONS: 
 
 
F-face looks uneven A-arms unable to move or move unevenly S-speech slurred or non-existent T-time-call 911 as soon as signs and symptoms begin-DO NOT go Back to bed or wait to see if you get better-TIME IS BRAIN. Warning Signs of HEART ATTACK Call 911 if you have these symptoms: 
? Chest discomfort.  Most heart attacks involve discomfort in the center of the chest that lasts more than a few minutes, or that goes away and comes back. It can feel like uncomfortable pressure, squeezing, fullness, or pain. ? Discomfort in other areas of the upper body. Symptoms can include pain or discomfort in one or both arms, the back, neck, jaw, or stomach. ? Shortness of breath with or without chest discomfort. ? Other signs may include breaking out in a cold sweat, nausea, or lightheadedness. Don't wait more than five minutes to call [address]! Fast action can save your life. Calling 911 is almost always the fastest way to get lifesaving treatment. Emergency Medical Services staff can begin treatment when they arrive  up to an hour sooner than if someone gets to the hospital by car. The discharge information has been reviewed with the patient and daughter. The patient and daughter verbalized understanding. Discharge medications reviewed with the patient and daughter and appropriate educational materials and side effects teaching were provided. ___________________________________________________________________________________________________________________________________ Introducing [facility]! [facility] introduces Chamate patient portal. Now you can access parts of your medical record, email your doctor's office, and request medication refills online. 1. In your internet browser, go to [email or web address] 2. Click on the First Time User? Click Here link in the Sign In box. You will see the New Member Sign Up page. 3. Enter your Chamate Access Code exactly as it appears below. You will not need to use this code after youve completed the sign-up process. If you do not sign up before the expiration date, you must request a new code. · Chamate Access Code: [email or web address] Expires: 4/15/2018  2:54 PM 
 
4. Enter the last four digits of your Social Security Number (xxxx) and Date of Birth (mm/dd/yyyy) as indicated and click Submit. You will be taken to the next sign-up page. 5. Create a YieldPlanet ID. This will be your YieldPlanet login ID and cannot be changed, so think of one that is secure and easy to remember. 6. Create a YieldPlanet password. You can change your password at any time. 7. Enter your Password Reset Question and Answer. This can be used at a later time if you forget your password. 8. Enter your e-mail address. You will receive e-mail notification when new information is available in [address] 9. Click Sign Up. You can now view and download portions of your medical record. 10. Click the Download Summary menu link to download a portable copy of your medical information. If you have questions, please visit the Frequently Asked Questions section of the YieldPlanet website. Remember, YieldPlanet is NOT to be used for urgent needs. For medical emergencies, dial 911. Now available from your iPhone and Android! Providers Seen During Your Hospitalization Provider Specialty Primary office phone Isauro Rockwell MD Gastroenterology [phone] Your Primary Care Physician (PCP) Primary Care Physician Office Phone Office Fax Kayleigh [place] [phone] You are allergic to the following Allergen Reactions Codeine Nausea and Vomiting Januvia (Sitagliptin) Nausea Only Myalgia Pcn (Penicillins) Swelling Recent Documentation Height Weight Breastfeeding? BMI OB Status Smoking Status 1.6 m 90.3 kg No 35.25 kg/m2 Postmenopausal Never Smoker Emergency Contacts Name Discharge Info Relation Home Work Mobile Giorgiokannan Ryder DISCHARGE CAREGIVER [3] Daughter [21] [phone] Tan Salazar DISCHARGE CAREGIVER [3] Daughter [21] [phone] Patient Belongings The following personal items are in your possession at time of discharge: 
  Dental Appliances: None  Visual Aid: None Please provide this summary of care documentation to your next provider. Signatures-by signing, you are acknowledging that this After Visit Summary has been reviewed with you and you have received a copy. Patient Signature:  ____________________________________________________________ Date:  ____________________________________________________________  
  
Lawerence Amparo Provider Signature:  ____________________________________________________________ Date:  ____________________________________________________________

## 2018-02-02 NOTE — ANESTHESIA POSTPROCEDURE EVALUATION
Post-Anesthesia Evaluation and Assessment    Patient: Bobo Conklin MRN: [id number]  SSN: [id number]    YOB: 1955  Age: 58 y.o. Sex: female       Cardiovascular Function/Vital Signs  Visit Vitals    /67    Pulse 100    Temp 36.9 °C (98.4 °F)    Resp 20    Ht 5' 3\" (1.6 m)    Wt 90.3 kg (199 lb)    SpO2 100%    Breastfeeding No    BMI 35.25 kg/m2       Patient is status post MAC anesthesia for Procedure(s):  COLONOSCOPY with Polypectomy. Nausea/Vomiting: None    Postoperative hydration reviewed and adequate. Pain:  Pain Scale 1: Numeric (0 - 10) (02/02/18 1149)  Pain Intensity 1: 3 (02/02/18 1149)   Managed    Neurological Status: At baseline    Mental Status and Level of Consciousness: Alert and oriented     Pulmonary Status:   O2 Device: Room air (02/02/18 1149)   Adequate oxygenation and airway patent    Complications related to anesthesia: None    Post-anesthesia assessment completed.  No concerns    Signed By: Deny Brian MD     February 2, 2018

## 2018-02-02 NOTE — H&P
[facility]    [email or web address]      Impression:   1.occult pos      Plan:     1. [place] all risks discussed       Chief Complaint: occult pos      HPI:  Eliseo Kay is a 58 y.o. female who is being seen on consult for occult pos. PMH:   Past Medical History:   Diagnosis Date    Anemia     Arthritis     Back pain     Diabetes [facility] [address]     Diabetes mellitus type 2, controlled ([facility] [address] 6/30/2016    Diabetic eye exam ([facility] [address] 2016    High cholesterol     Hypertension     Right arm weakness 3/3/15    pending EMG 4/22/15    Right knee DJD 1980    Cortisone shots/Mobic/ original injury    Right sciatic nerve pain 3/3/15    on Dr. Brooks Aguilar Spider bite     brown recluse       PSH:   Past Surgical History:   Procedure Laterality Date    DEBRIDE NECROTIC SKIN/ TISSUE, ABD WALL Left 2002    spider bite     HX CHOLECYSTECTOMY  1986       Social HX:   Social History     Social History    Marital status:      Spouse name: N/A    Number of children: N/A    Years of education: N/A     Occupational History    [employer]     full time     Social History Main Topics    Smoking status: Never Smoker    Smokeless tobacco: Never Used    Alcohol use No    Drug use: No    Sexual activity: No     Other Topics Concern    Not on file     Social History Narrative       FHX:   Family History   Problem Relation Age of Onset    Hypertension Mother     Diabetes Mother     Colon Cancer Father 72    Blindness Father      trauma    Diabetes Sister     Diabetes Brother        Allergy:   Allergies   Allergen Reactions    Codeine Nausea and Vomiting    Januvia [Sitagliptin] Nausea Only and Myalgia    Pcn [Penicillins] Swelling       Home Medications:     Prescriptions Prior to Admission   Medication Sig    metFORMIN (GLUCOPHAGE) 1,000 mg tablet Take 1 Tab by mouth two (2) times daily (with meals).     lisinopril (PRINIVIL, ZESTRIL) 20 mg tablet Take 1 Tab by mouth daily.  glipiZIDE (GLUCOTROL) 10 mg tablet Take 1 Tab by mouth two (2) times a day.  magnesium oxide (MAG-OX) 400 mg tablet Take 1 Tab by mouth daily.  acetaminophen (TYLENOL ARTHRITIS PAIN) 650 mg CR tablet Take 1 Tab by mouth every eight (8) hours as needed for Pain. Indications: Arthritic Pain    Calcium Citrate-Vitamin D3 500 mg calcium -400 unit chew Take 1 Tab by mouth two (2) times a day. Indications: VITAMIN D DEFICIENCY    pitavastatin (LIVALO) 2 mg tablet Take 1 Tab by mouth daily.  ferrous sulfate 325 mg (65 mg iron) tablet Take 1 Tab by mouth Daily (before breakfast). Indications: IRON DEFICIENCY ANEMIA    cyclobenzaprine (FLEXERIL) 10 mg tablet TAKE ONE TABLET BY MOUTH NIGHTLY FOR MUSCLE SPASM    OTHER Alphabetic vitamins take as directed       Review of Systems:     Constitutional: No fevers, chills, weight loss, fatigue. Skin: No rashes, pruritis, jaundice, ulcerations, erythema. HENT: No headaches, nosebleeds, sinus pressure, rhinorrhea, sore throat. Eyes: No visual changes, blurred vision, eye pain, photophobia, jaundice. Cardiovascular: No chest pain, heart palpitations. Respiratory: No cough, SOB, wheezing, chest discomfort, orthopnea. Gastrointestinal: No pain or clinical bleed    Genitourinary: No dysuria, bleeding, discharge, pyuria. Musculoskeletal: No weakness, arthralgias, wasting. Endo: No sweats. Heme: No bruising, easy bleeding. Allergies: As noted. Neurological: Cranial nerves intact. Alert and oriented. Gait not assessed. Psychiatric:  No anxiety, depression, hallucinations. Visit Vitals    /73    Pulse (!) 115    Temp 98.7 °F (37.1 °C)    Resp 18    Ht 5' 3\" (1.6 m)    Wt 90.3 kg (199 lb)    SpO2 100%    Breastfeeding No    BMI 35.25 kg/m2       Physical Assessment:     constitutional: appearance: well developed, well nourished, normal habitus, no deformities, in no acute distress. skin: inspection: no rashes, ulcers, icterus or other lesions; no clubbing or telangiectasias. palpation: no induration or subcutaneos nodules. eyes: inspection: normal conjunctivae and lids; no jaundice pupils: symmetrical, normoreactive to light, normal accommodation and size. ENMT: mouth: normal oral mucosa,lips and gums; good dentition. oropharynx: normal tongue, hard and soft palate; posterior pharynx without erythema, exudate or lesions. neck: no masses organomegaly or tenderness. respiratory: effort: normal chest excursion; no intercostal retraction or accessory muscle use. cardiovascular: abdominal aorta: normal size and position; no bruits. palpation: PMI of normal size and position; normal rhythm; no thrill or murmurs. abdominal: abdomen: normal consistency; no tenderness or masses. hernias: no hernias appreciated. liver: normal size and consistency. spleen: not palpable. rectal: hemoccult/guaiac: not performed. musculoskeletal: no deformities or muscle wasting   lymphatic: axilae: not palpable. groin: not palpable. neck: within normal limits. other: not palpable. neurologic: cranial nerves: II-XII normal.   psychiatric: judgement/insight: within normal limits. memory: within normal limits for recent and remote events. mood and affect: no evidence of depression, anxiety or agitation. orientation: oriented to time, space and person. Basic Metabolic Profile   No results for input(s): NA, K, CL, CO2, BUN, GLU, CA, MG, PHOS in the last 72 hours. No lab exists for component: CREAT      CBC w/Diff    No results for input(s): WBC, RBC, HGB, HCT, MCV, MCH, MCHC, RDW, PLT, HGBEXT, HCTEXT, PLTEXT in the last 72 hours. No lab exists for component: MPV No results for input(s): GRANS, LYMPH, EOS, PRO, MYELO, METAS, BLAST in the last 72 hours.     No lab exists for component: MONO, BASO     Hepatic Function   No results for input(s): ALB, TP, TBILI, GPT, SGOT, AP, AML, LPSE in the last 72 hours.    No lab exists for component: Rafa Jaimes MD, MJORDAN. [facility], [facility]  [email or web address] [facility]

## 2018-02-02 NOTE — DISCHARGE INSTRUCTIONS
Colonoscopy: What to Expect at [address] [place] [address]  After you have a colonoscopy, you will stay at the clinic for 1 to 2 hours until the medicines wear off. Then you can go home. But you will need to arrange for a ride. Your doctor will tell you when you can eat and do your other usual activities. Your doctor will talk to you about when you will need your next colonoscopy. Your doctor can help you decide how often you need to be checked. This will depend on the results of your test and your risk for colorectal cancer. After the test, you may be bloated or have gas pains. You may need to pass gas. If a biopsy was done or a polyp was removed, you may have streaks of blood in your stool (feces) for a few days. This care sheet gives you a general idea about how long it will take for you to recover. But each person recovers at a different pace. Follow the steps below to get better as quickly as possible. How can you care for yourself at home? Activity  ? · Rest when you feel tired. ? · You can do your normal activities when it feels okay to do so. Diet  ? · Follow your doctor's directions for eating. ? · Unless your doctor has told you not to, drink plenty of fluids. This helps to replace the fluids that were lost during the colon prep. ? · Do not drink alcohol. Medicines  ? · Your doctor will tell you if and when you can restart your medicines. He or she will also give you instructions about taking any new medicines. ? · If you take blood thinners, such as warfarin (Coumadin), clopidogrel (Plavix), or aspirin, be sure to talk to your doctor. He or she will tell you if and when to start taking those medicines again. Make sure that you understand exactly what your doctor wants you to do. ? · If polyps were removed or a biopsy was done during the test, your doctor may tell you not to take aspirin or other anti-inflammatory medicines for a few days.  These include ibuprofen (Advil, Motrin) and naproxen (Hector). Other instructions  ? · For your safety, do not drive or operate machinery until the medicine wears off and you can think clearly. Your doctor may tell you not to drive or operate machinery until the day after your test.   ? · Do not sign legal documents or make major decisions until the medicine wears off and you can think clearly. The anesthesia can make it hard for you to fully understand what you are agreeing to. Follow-up care is a key part of your treatment and safety. Be sure to make and go to all appointments, and call your doctor if you are having problems. It's also a good idea to know your test results and keep a list of the medicines you take. When should you call for help? Call 911 anytime you think you may need emergency care. For example, call if:  ? · You passed out (lost consciousness). ? · You pass maroon or bloody stools. ? · You have trouble breathing. ?Call your doctor now or seek immediate medical care if:  ? · You have pain that does not get better after you take pain medicine. ? · You are sick to your stomach or cannot drink fluids. ? · You have new or worse belly pain. ? · You have blood in your stools. ? · You have a fever. ? · You cannot pass stools or gas. ? Watch closely for changes in your health, and be sure to contact your doctor if you have any problems. Where can you learn more? Go to http://dixon-edilson.info/. Enter E264 in the search box to learn more about \"Colonoscopy: What to Expect at Home. \"  Current as of: May 12, 2017  Content Version: 11.4  © 9339-5698 [employer]. Care instructions adapted under license by [employer] (which disclaims liability or warranty for this information). If you have questions about a medical condition or this instruction, always ask your healthcare professional. [address] any warranty or liability for your use of this information.   Colon Polyps: Care Instructions  Your Care Instructions    Colon polyps are growths in the colon or the rectum. The cause of most colon polyps is not known, and most people who get them do not have any problems. But a certain kind can turn into cancer. For this reason, regular testing for colon polyps is important for people age 48 and older and anyone who has an increased risk for colon cancer. Polyps are usually found through routine colon cancer screening tests. Although most colon polyps are not cancerous, they are usually removed and then tested for cancer. Screening for colon cancer saves lives because the cancer can usually be cured if it is caught early. If you have a polyp that is the type that can turn into cancer, you may need more tests to examine your entire colon. The doctor will remove any other polyps that he or she finds, and you will be tested more often. Follow-up care is a key part of your treatment and safety. Be sure to make and go to all appointments, and call your doctor if you are having problems. It's also a good idea to know your test results and keep a list of the medicines you take. How can you care for yourself at home? Regular exams to look for colon polyps are the best way to prevent polyps from turning into colon cancer. These can include stool tests, sigmoidoscopy, colonoscopy, and CT colonography. Talk with your doctor about a testing schedule that is right for you. To prevent polyps  There is no home treatment that can prevent colon polyps. But these steps may help lower your risk for cancer. · Stay active. Being active can help you get to and stay at a healthy weight. Try to exercise on most days of the week. Walking is a good choice. · Eat well. Choose a variety of vegetables, fruits, legumes (such as peas and beans), fish, poultry, and whole grains. · Do not smoke. If you need help quitting, talk to your doctor about stop-smoking programs and medicines.  These can increase your chances of quitting for good. · If you drink alcohol, limit how much you drink. Limit alcohol to 2 drinks a day for men and 1 drink a day for women. When should you call for help? Call your doctor now or seek immediate medical care if:  ? · You have severe belly pain. ? · Your stools are maroon or very bloody. ? Watch closely for changes in your health, and be sure to contact your doctor if:  ? · You have a fever. ? · You have nausea or vomiting. ? · You have a change in bowel habits (new constipation or diarrhea). ? · Your symptoms get worse or are not improving as expected. Where can you learn more? Go to http://dixon-edilson.info/. Enter 95 [id number] in the search box to learn more about \"Colon Polyps: Care Instructions. \"  Current as of: May 12, 2017  Content Version: 11.4  © 7027-5411 [employer]. Care instructions adapted under license by [employer] (which disclaims liability or warranty for this information). If you have questions about a medical condition or this instruction, always ask your healthcare professional. [place] [address] any warranty or liability for your use of this information. Diverticulosis: Care Instructions  Your Care Instructions  In diverticulosis, pouches called diverticula form in the wall of the large intestine (colon). The pouches do not cause any pain or other symptoms. Most people who have diverticulosis do not know they have it. But the pouches sometimes bleed, and if they become infected, they can cause pain and other symptoms. When this happens, it is called diverticulitis. Diverticula form when pressure pushes the wall of the colon outward at certain weak points. A diet that is too low in fiber can cause diverticula. Follow-up care is a key part of your treatment and safety. Be sure to make and go to all appointments, and call your doctor if you are having problems.  It's also a good idea to know your test results and keep a list of the medicines you take. How can you care for yourself at home? · Include fruits, leafy green vegetables, beans, and whole grains in your diet each day. These foods are high in fiber. · Take a fiber supplement, such as Citrucel or Metamucil, every day if needed. Read and follow all instructions on the label. · Drink plenty of fluids, enough so that your urine is light yellow or clear like water. If you have kidney, heart, or liver disease and have to limit fluids, talk with your doctor before you increase the amount of fluids you drink. · Get at least 30 minutes of exercise on most days of the week. Walking is a good choice. You also may want to do other activities, such as running, swimming, cycling, or playing tennis or team sports. · Cut out foods that cause gas, pain, or other symptoms. When should you call for help? Call your doctor now or seek immediate medical care if:  ? · You have belly pain. ? · You pass maroon or very bloody stools. ? · You have a fever. ? · You have nausea and vomiting. ? · You have unusual changes in your bowel movements or abdominal swelling. ? · You have burning pain when you urinate. ? · You have abnormal vaginal discharge. ? · You have shoulder pain. ? · You have cramping pain that does not get better when you have a bowel movement or pass gas. ? · You pass gas or stool from your urethra while urinating. ? Watch closely for changes in your health, and be sure to contact your doctor if you have any problems. Where can you learn more? Go to http://dixon-edilson.info/. Enter J571 in the search box to learn more about \"Diverticulosis: Care Instructions. \"  Current as of: May 12, 2017  Content Version: 11.4  © 0999-0964 [employer]. Care instructions adapted under license by [employer] (which disclaims liability or warranty for this information).  If you have questions about a medical condition or this instruction, always ask your healthcare professional. [place] [address] any warranty or liability for your use of this information. DISCHARGE SUMMARY from Nurse    PATIENT INSTRUCTIONS:    After general anesthesia or intravenous sedation, for 24 hours or while taking prescription Narcotics:  · Limit your activities  · Do not drive and operate hazardous machinery  · Do not make important personal or business decisions  · Do  not drink alcoholic beverages  · If you have not urinated within 8 hours after discharge, please contact your surgeon on call. Report the following to your surgeon:  · Excessive pain, swelling, redness or odor of or around the surgical area  · Temperature over 100.5  · Nausea and vomiting lasting longer than 4 hours or if unable to take medications  · Any signs of decreased circulation or nerve impairment to extremity: change in color, persistent  numbness, tingling, coldness or increase pain  · Any questions    *  Please give a list of your current medications to your Primary Care Provider. *  Please update this list whenever your medications are discontinued, doses are      changed, or new medications (including over-the-counter products) are added. *  Please carry medication information at all times in case of emergency situations. These are general instructions for a healthy lifestyle:    No smoking/ No tobacco products/ Avoid exposure to second hand smoke  Surgeon General's Warning:  Quitting smoking now greatly reduces serious risk to your health.     Obesity, smoking, and sedentary lifestyle greatly increases your risk for illness    A healthy diet, regular physical exercise & weight monitoring are important for maintaining a healthy lifestyle    You may be retaining fluid if you have a history of heart failure or if you experience any of the following symptoms:  Weight gain of 3 pounds or more overnight or 5 pounds in a week, increased swelling in our hands or feet or shortness of breath while lying flat in bed. Please call your doctor as soon as you notice any of these symptoms; do not wait until your next office visit. Recognize signs and symptoms of STROKE:    F-face looks uneven    A-arms unable to move or move unevenly    S-speech slurred or non-existent    T-time-call 911 as soon as signs and symptoms begin-DO NOT go       Back to bed or wait to see if you get better-TIME IS BRAIN. Warning Signs of HEART ATTACK     Call 911 if you have these symptoms:   Chest discomfort. Most heart attacks involve discomfort in the center of the chest that lasts more than a few minutes, or that goes away and comes back. It can feel like uncomfortable pressure, squeezing, fullness, or pain.  Discomfort in other areas of the upper body. Symptoms can include pain or discomfort in one or both arms, the back, neck, jaw, or stomach.  Shortness of breath with or without chest discomfort.  Other signs may include breaking out in a cold sweat, nausea, or lightheadedness. Don't wait more than five minutes to call 911 - MINUTES MATTER! Fast action can save your life. Calling 911 is almost always the fastest way to get lifesaving treatment. Emergency Medical Services staff can begin treatment when they arrive -- up to an hour sooner than if someone gets to the hospital by car. The discharge information has been reviewed with the patient and daughter. The patient and daughter verbalized understanding. Discharge medications reviewed with the patient and daughter and appropriate educational materials and side effects teaching were provided.   ___________________________________________________________________________________________________________________________________

## 2018-02-26 DIAGNOSIS — M62.830 SPASM OF LUMBAR PARASPINOUS MUSCLE: ICD-10-CM

## 2018-02-26 RX ORDER — CYCLOBENZAPRINE HCL 10 MG
TABLET ORAL
Qty: 30 TAB | Refills: 2 | Status: SHIPPED | OUTPATIENT
Start: 2018-02-26 | End: 2018-06-07 | Stop reason: SDUPTHER

## 2018-05-31 ENCOUNTER — LAB ONLY (OUTPATIENT)
Dept: FAMILY MEDICINE CLINIC | Age: 63
End: 2018-05-31

## 2018-05-31 ENCOUNTER — HOSPITAL ENCOUNTER (OUTPATIENT)
Dept: LAB | Age: 63
Discharge: HOME | End: 2018-05-31

## 2018-05-31 DIAGNOSIS — E78.5 HYPERLIPIDEMIA WITH TARGET LDL LESS THAN 100: ICD-10-CM

## 2018-05-31 DIAGNOSIS — E11.8 CONTROLLED DIABETES MELLITUS TYPE 2 WITH COMPLICATIONS, UNSPECIFIED WHETHER LONG TERM INSULIN USE (HCC): Primary | ICD-10-CM

## 2018-05-31 DIAGNOSIS — E11.8 CONTROLLED DIABETES MELLITUS TYPE 2 WITH COMPLICATIONS (HCC): ICD-10-CM

## 2018-05-31 LAB
ALBUMIN SERPL-MCNC: 3.8 G/DL (ref 3.4–5)
ALBUMIN/GLOB SERPL: 1.1 {RATIO} (ref 0.8–1.7)
ALP SERPL-CCNC: 74 U/L (ref 45–117)
ALT SERPL-CCNC: 18 U/L (ref 13–56)
ANION GAP SERPL CALC-SCNC: 10 MMOL/L (ref 3–18)
AST SERPL-CCNC: 19 U/L (ref 15–37)
BILIRUB SERPL-MCNC: 0.7 MG/DL (ref 0.2–1)
BUN SERPL-MCNC: 14 MG/DL (ref 7–18)
BUN/CREAT SERPL: 13 (ref 12–20)
CALCIUM SERPL-MCNC: 9.2 MG/DL (ref 8.5–10.1)
CHLORIDE SERPL-SCNC: 105 MMOL/L (ref 100–108)
CHOLEST SERPL-MCNC: 179 MG/DL
CO2 SERPL-SCNC: 27 MMOL/L (ref 21–32)
CREAT SERPL-MCNC: 1.06 MG/DL (ref 0.6–1.3)
ERYTHROCYTE [DISTWIDTH] IN BLOOD BY AUTOMATED COUNT: 13.3 % (ref 11.6–14.5)
EST. AVERAGE GLUCOSE BLD GHB EST-MCNC: 180 MG/DL
GLOBULIN SER CALC-MCNC: 3.4 G/DL (ref 2–4)
GLUCOSE SERPL-MCNC: 99 MG/DL (ref 74–99)
HBA1C MFR BLD: 7.9 % (ref 4.2–5.6)
HCT VFR BLD AUTO: 34.2 % (ref 35–45)
HDLC SERPL-MCNC: 71 MG/DL (ref 40–60)
HDLC SERPL: 2.5 {RATIO} (ref 0–5)
HGB BLD-MCNC: 10.5 G/DL (ref 12–16)
LDLC SERPL CALC-MCNC: 81.8 MG/DL (ref 0–100)
LIPID PROFILE,FLP: ABNORMAL
MCH RBC QN AUTO: 27.6 PG (ref 24–34)
MCHC RBC AUTO-ENTMCNC: 30.7 G/DL (ref 31–37)
MCV RBC AUTO: 89.8 FL (ref 74–97)
PLATELET # BLD AUTO: 302 K/UL (ref 135–420)
PMV BLD AUTO: 10.1 FL (ref 9.2–11.8)
POTASSIUM SERPL-SCNC: 4.3 MMOL/L (ref 3.5–5.5)
PROT SERPL-MCNC: 7.2 G/DL (ref 6.4–8.2)
RBC # BLD AUTO: 3.81 M/UL (ref 4.2–5.3)
SODIUM SERPL-SCNC: 142 MMOL/L (ref 136–145)
TRIGL SERPL-MCNC: 131 MG/DL (ref ?–150)
VLDLC SERPL CALC-MCNC: 26.2 MG/DL
WBC # BLD AUTO: 6.7 K/UL (ref 4.6–13.2)

## 2018-05-31 PROCEDURE — 85027 COMPLETE CBC AUTOMATED: CPT | Performed by: NURSE PRACTITIONER

## 2018-05-31 PROCEDURE — 80061 LIPID PANEL: CPT | Performed by: NURSE PRACTITIONER

## 2018-05-31 PROCEDURE — 83036 HEMOGLOBIN GLYCOSYLATED A1C: CPT | Performed by: NURSE PRACTITIONER

## 2018-05-31 PROCEDURE — 80053 COMPREHEN METABOLIC PANEL: CPT | Performed by: NURSE PRACTITIONER

## 2018-06-11 ENCOUNTER — OFFICE VISIT (OUTPATIENT)
Dept: FAMILY MEDICINE CLINIC | Age: 63
End: 2018-06-11

## 2018-06-11 VITALS
TEMPERATURE: 98.8 F | HEIGHT: 63 IN | WEIGHT: 202.6 LBS | DIASTOLIC BLOOD PRESSURE: 74 MMHG | SYSTOLIC BLOOD PRESSURE: 119 MMHG | OXYGEN SATURATION: 100 % | BODY MASS INDEX: 35.9 KG/M2 | HEART RATE: 104 BPM

## 2018-06-11 DIAGNOSIS — E11.8 CONTROLLED DIABETES MELLITUS TYPE 2 WITH COMPLICATIONS, UNSPECIFIED WHETHER LONG TERM INSULIN USE (HCC): Primary | ICD-10-CM

## 2018-06-11 DIAGNOSIS — I10 HTN, GOAL BELOW 130/80: ICD-10-CM

## 2018-06-11 DIAGNOSIS — Z12.39 BREAST CANCER SCREENING: ICD-10-CM

## 2018-06-11 DIAGNOSIS — D17.21 LIPOMA OF RIGHT UPPER EXTREMITY: ICD-10-CM

## 2018-06-11 DIAGNOSIS — K03.81 BROKEN OR CRACKED TOOTH, NONTRAUMATIC: ICD-10-CM

## 2018-06-11 DIAGNOSIS — D64.9 ANEMIA, UNSPECIFIED TYPE: ICD-10-CM

## 2018-06-11 DIAGNOSIS — E78.5 HYPERLIPIDEMIA WITH TARGET LDL LESS THAN 100: ICD-10-CM

## 2018-06-11 PROBLEM — E11.21 TYPE 2 DIABETES WITH NEPHROPATHY (HCC): Status: ACTIVE | Noted: 2018-06-11

## 2018-06-11 RX ORDER — LISINOPRIL 20 MG/1
20 TABLET ORAL DAILY
Qty: 30 TAB | Refills: 5 | Status: SHIPPED | OUTPATIENT
Start: 2018-06-11 | End: 2018-10-08 | Stop reason: SDUPTHER

## 2018-06-11 RX ORDER — METFORMIN HYDROCHLORIDE 1000 MG/1
1000 TABLET ORAL 2 TIMES DAILY WITH MEALS
Qty: 60 TAB | Refills: 5 | Status: SHIPPED | OUTPATIENT
Start: 2018-06-11 | End: 2018-10-08 | Stop reason: SDUPTHER

## 2018-06-11 RX ORDER — GLIPIZIDE 10 MG/1
10 TABLET ORAL 2 TIMES DAILY
Qty: 60 TAB | Refills: 5 | Status: SHIPPED | OUTPATIENT
Start: 2018-06-11 | End: 2018-10-08 | Stop reason: SDUPTHER

## 2018-06-11 RX ORDER — LANOLIN ALCOHOL/MO/W.PET/CERES
325 CREAM (GRAM) TOPICAL
Qty: 30 TAB | Refills: 11 | Status: SHIPPED | OUTPATIENT
Start: 2018-06-11

## 2018-06-11 NOTE — MR AVS SNAPSHOT
[address] [facility] [id number] [phone] Patient: Yrodan Tellez MRN: [id number] :1955 Visit Information Date & Time Provider Department Dept. Phone Encounter #  
 2018 11:00 AM Violeta Linares NP  [facility] [id number] Follow-up Instructions Return in about 4 months (around 10/11/2018), or if symptoms worsen or fail to improve. Your Appointments 10/8/2018 10:00 AM  
Follow Up with Violeta Linares NP [address] [facility] Appt Note: 4 mth follow up  
 [address] [facility] [address]  
[facility] [address] Upcoming Health Maintenance Date Due DTaP/Tdap/Td series (1 - Tdap) 10/16/1976 BREAST CANCER SCRN MAMMOGRAM 10/16/2005 ZOSTER VACCINE AGE 60> 2015 EYE EXAM RETINAL OR DILATED Q1 2017 Pneumococcal 19-64 Medium Risk (1 of 1 - PPSV23) 2019* Influenza Age 5 to Adult 2018 HEMOGLOBIN A1C Q6M 2018 FOBT Q 1 YEAR AGE 50-75 2018 MICROALBUMIN Q1 2019 LIPID PANEL Q1 2019 FOOT EXAM Q1 2019 PAP AKA CERVICAL CYTOLOGY 2020 *Topic was postponed. The date shown is not the original due date. Allergies as of 2018  Review Complete On: 2018 By: Violeta Linares NP Severity Noted Reaction Type Reactions Codeine  2012    Nausea and Vomiting Januvia [Sitagliptin]  2014   Side Effect Nausea Only, Myalgia Pcn [Penicillins]  2012    Swelling Current Immunizations  Reviewed on 3/2/2017 Name Date Influenza Vaccine 2017 Influenza Vaccine (Quad) PF 3/2/2017 Influenza Vaccine Split 2012 Not reviewed this visit You Were Diagnosed With   
  
 Codes Comments Controlled diabetes mellitus type 2 with complications, unspecified whether long term insulin use (HCC)    -  Primary ICD-10-CM: E11.8 ICD-9-CM: 250.90   
 HTN, goal below 130/80     ICD-10-CM: I10 
ICD-9-CM: 401.9 Hyperlipidemia with target LDL less than 100     ICD-10-CM: E78.5 ICD-9-CM: 272.4 Breast cancer screening     ICD-10-CM: Z12.31 
ICD-9-CM: V76.10 Broken or cracked tooth, nontraumatic     ICD-10-CM: K03.81 ICD-9-CM: 521.81 Lipoma of right upper extremity     ICD-10-CM: D17.21 
ICD-9-CM: 214.8 Anemia, unspecified type     ICD-10-CM: D64.9 ICD-9-CM: 692. 9 Vitals BP Pulse Temp Height(growth percentile) Weight(growth percentile) SpO2  
 119/74 (BP 1 Location: Left arm, BP Patient Position: Sitting) (!) 104 98.8 °F (37.1 °C) (Oral) 5' 3\" (1.6 m) 202 lb 9.6 oz (91.9 kg) 100% BMI OB Status Smoking Status 35.89 kg/m2 Postmenopausal Never Smoker Vitals History BMI and BSA Data Body Mass Index Body Surface Area  
 35.89 kg/m 2 2.02 m 2 Preferred Pharmacy Pharmacy Name Phone [address] [phone] Your Updated Medication List  
  
   
This list is accurate as of 6/11/18 11:18 AM.  Always use your most recent med list.  
  
  
  
  
 Calcium Citrate-Vitamin D3 500 mg calcium -400 unit Chew Take 1 Tab by mouth two (2) times a day. Indications: VITAMIN D DEFICIENCY  
  
 cyclobenzaprine 10 mg tablet Commonly known as:  FLEXERIL  
TAKE 1 TABLET BY MOUTH NIGHTLY FOR MUSCLE SPASM  
  
 ferrous sulfate 325 mg (65 mg iron) tablet Take 1 Tab by mouth Daily (before breakfast). Indications: Iron Deficiency Anemia  
  
 glipiZIDE 10 mg tablet Commonly known as:  Ramiro Schillings Take 1 Tab by mouth two (2) times a day. lisinopril 20 mg tablet Commonly known as:  Tildon Lorraine Take 1 Tab by mouth daily. magnesium oxide 400 mg tablet Commonly known as:  MAG-OX Take 1 Tab by mouth daily. metFORMIN 1,000 mg tablet Commonly known as:  GLUCOPHAGE Take 1 Tab by mouth two (2) times daily (with meals). OTHER Alphabetic vitamins take as directed  
  
 pitavastatin calcium 2 mg tablet Commonly known as:  LIVALO Take 1 Tab by mouth daily. TYLENOL ARTHRITIS PAIN 650 mg Terisa Jose Generic drug:  acetaminophen Take 1 Tab by mouth every eight (8) hours as needed for Pain. Indications: Arthritic Pain Prescriptions Sent to Pharmacy Refills  
 metFORMIN (GLUCOPHAGE) 1,000 mg tablet 5 Sig: Take 1 Tab by mouth two (2) times daily (with meals). Class: Normal  
 Pharmacy: [facility] DR LEANA LOMBARDO [address] [place] [address]. Ph #: [phone] Route: Oral  
 glipiZIDE (GLUCOTROL) 10 mg tablet 5 Sig: Take 1 Tab by mouth two (2) times a day. Class: Normal  
 Pharmacy: [facility] DR LEANA LOMBARDO [address] [place] [address]. Ph #: [phone] Route: Oral  
 lisinopril (PRINIVIL, ZESTRIL) 20 mg tablet 5 Sig: Take 1 Tab by mouth daily. Class: Normal  
 Pharmacy: [facility] DR LEANA LOMBARDO [address] [place] [address]. Ph #: [phone] Route: Oral  
 ferrous sulfate 325 mg (65 mg iron) tablet 11 Sig: Take 1 Tab by mouth Daily (before breakfast). Indications: Iron Deficiency Anemia Class: Normal  
 Pharmacy: [facility] DR LEANA LOMBARDO [address] [place] [address]. Ph #: [phone] Route: Oral  
  
We Performed the Following REFERRAL TO DENTISTRY [REF18 Custom] Comments:  
 Please evaluate patient for broken tooth and tooth extraction . REFERRAL TO OPTOMETRY [id number] Custom] Comments:  
 [address] [YHW20 Custom] Comments:  
 Referral to Every [address]  Program 
Phone [phone] Fax [phone] [facility] GUIDELINES 
 
18-64 y.o. with problem 38-51 y.o. screening available but on waitlist 
 52-64 y.o. screening appt will be scheduled Patient calls for financial screening and appt. [phone] Follow-up Instructions Return in about 4 months (around 10/11/2018), or if symptoms worsen or fail to improve. Referral Information Referral ID Referred By Referred To  
  
 [id number] Erum MCKEON Not Available Visits Status Start Date End Date 1 New Request 6/11/18 6/11/19 If your referral has a status of pending review or denied, additional information will be sent to support the outcome of this decision. Referral ID Referred By Referred To  
 [id number] Erum MCKEON Not Available Visits Status Start Date End Date 1 New Request 6/11/18 6/11/19 If your referral has a status of pending review or denied, additional information will be sent to support the outcome of this decision. Referral ID Referred By Referred To  
 [id number] Erum MCKEON Not Available Visits Status Start Date End Date 1 New Request 6/11/18 6/11/19 If your referral has a status of pending review or denied, additional information will be sent to support the outcome of this decision. Introducing [facility]! Willadean Saint introduces [employer] patient portal. Now you can access parts of your medical record, email your doctor's office, and request medication refills online. 1. In your internet browser, go to [email or web address] 2. Click on the First Time User? Click Here link in the Sign In box. You will see the New Member Sign Up page. 3. Enter your Ph[employer]bridge Access Code exactly as it appears below. You will not need to use this code after youve completed the sign-up process. If you do not sign up before the expiration date, you must request a new code. · Phybridge Access Code: [email or web address] Expires: 9/9/2018 11:17 AM 
 
4.  Enter the last four digits of your Social Security Number (xxxx) and Date of Birth (mm/dd/yyyy) as indicated and click Submit. You will be taken to the next sign-up page. 5. Create a [employer] ID. This will be your [employer] login ID and cannot be changed, so think of one that is secure and easy to remember. 6. Create a [employer] password. You can change your password at any time. 7. Enter your Password Reset Question and Answer. This can be used at a later time if you forget your password. 8. Enter your e-mail address. You will receive e-mail notification when new information is available in [address] 9. Click Sign Up. You can now view and download portions of your medical record. 10. Click the Download Summary menu link to download a portable copy of your medical information. If you have questions, please visit the Frequently Asked Questions section of the [employer] website. Remember, [employer] is NOT to be used for urgent needs. For medical emergencies, dial 911. Now available from your iPhone and Android! Please provide this summary of care documentation to your next provider. Your primary care clinician is listed as Mica Lee. If you have any questions after today's visit, please call [phone].

## 2018-06-11 NOTE — PROGRESS NOTES
Chief Complaint   Patient presents with    Follow Up Chronic Condition    Hypertension    Diabetes    Cholesterol Problem       Subjective:     Dagoberto Slaazar is a 58 y.o. female seen for follow up of diabetes. She also has hypertension, hyperlipidemia and obesity. Diabetic Review of Systems - medication compliance: compliant all of the time, diabetic diet compliance: noncompliant some of the time, further diabetic ROS: no polyuria or polydipsia, no chest pain, dyspnea or TIA's, no numbness, tingling or pain in extremities, no unusual visual symptoms. Other symptoms and concerns: Reports a lump to right arm. Non tender that showed up recently. Patient Active Problem List    Diagnosis Date Noted    Type 2 diabetes with nephropathy ([facility] 06/11/2018    Mild chronic anemia 06/30/2016    Diabetes mellitus type 2, controlled ([facility] 06/30/2016    BMI 36.0-36.9,adult 10/14/2015    Renovascular hypertension 06/01/2015    Anemia 03/03/2015    Hypomagnesemia 03/03/2015    Internal hemorrhoid 03/03/2015    Adult BMI 40.0-44.9 kg/sq m ([facility] 03/03/2015    Hyperlipidemia with target LDL less than 100 05/06/2014    Diverticulitis 12/10/2013    Bell's palsy 07/07/2013    GERD (gastroesophageal reflux disease) 11/28/2012     Current Outpatient Prescriptions   Medication Sig Dispense Refill    metFORMIN (GLUCOPHAGE) 1,000 mg tablet Take 1 Tab by mouth two (2) times daily (with meals). 60 Tab 5    glipiZIDE (GLUCOTROL) 10 mg tablet Take 1 Tab by mouth two (2) times a day. 60 Tab 5    lisinopril (PRINIVIL, ZESTRIL) 20 mg tablet Take 1 Tab by mouth daily. 30 Tab 5    ferrous sulfate 325 mg (65 mg iron) tablet Take 1 Tab by mouth Daily (before breakfast). Indications: Iron Deficiency Anemia 30 Tab 11    cyclobenzaprine (FLEXERIL) 10 mg tablet TAKE 1 TABLET BY MOUTH NIGHTLY FOR MUSCLE SPASM 30 Tab 0    magnesium oxide (MAG-OX) 400 mg tablet Take 1 Tab by mouth daily.  30 Tab 11    acetaminophen (TYLENOL ARTHRITIS PAIN) 650 mg CR tablet Take 1 Tab by mouth every eight (8) hours as needed for Pain. Indications: Arthritic Pain      Calcium Citrate-Vitamin D3 500 mg calcium -400 unit chew Take 1 Tab by mouth two (2) times a day. Indications: VITAMIN D DEFICIENCY 60 Tab 11    pitavastatin (LIVALO) 2 mg tablet Take 1 Tab by mouth daily.  80 Tab 0    OTHER Alphabetic vitamins take as directed       Allergies   Allergen Reactions    Codeine Nausea and Vomiting    Januvia [Sitagliptin] Nausea Only and Myalgia    Pcn [Penicillins] Swelling     Past Medical History:   Diagnosis Date    Anemia     Arthritis     Back pain     [facility] ([facility]     Diabetes mellitus type 2, controlled ([facility] 6/30/2016    Diabetic eye exam ([facility] 2016    Diverticulosis of sigmoid colon 02/02/2018    High cholesterol     Hypertension     Right arm weakness 3/3/15    pending EMG 4/22/15    Right knee DJD 1980    Cortisone shots/Mobic/ original injury    Right sciatic nerve pain 3/3/15    on Dr. Annika Aguilar Spider bite     brown recluse     Past Surgical History:   Procedure Laterality Date    COLONOSCOPY N/A 2/2/2018    COLONOSCOPY with Polypectomy performed by Dieter Sultana MD at [facility] f/u 5 years    DEBRIDE NECROTIC SKIN/ TISSUE, ABD WALL Left 2002    spider bite     HX CHOLECYSTECTOMY  1986     Family History   Problem Relation Age of Onset    Hypertension Mother     Diabetes Mother     Colon Cancer Father 72    Blindness Father      trauma    Diabetes Sister     Diabetes Brother      Social History   Substance Use Topics    Smoking status: Never Smoker    Smokeless tobacco: Never Used    Alcohol use No        Lab Results  Component Value Date/Time   WBC 6.7 05/31/2018 09:03 AM   HGB 10.5 (L) 05/31/2018 09:03 AM   HCT 34.2 (L) 05/31/2018 09:03 AM   PLATELET 498 12/47/0746 09:03 AM   MCV 89.8 05/31/2018 09:03 AM     Lab Results  Component Value Date/Time   Hemoglobin A1c 7.9 (H) 05/31/2018 09:03 AM Hemoglobin A1c 6.6 (H) 01/09/2018 07:23 AM   Hemoglobin A1c 6.9 (H) 08/29/2017 07:14 AM   Glucose 99 05/31/2018 09:03 AM   Glucose (POC) 125 (H) 02/02/2018 11:03 AM   Microalbumin/Creat ratio (mg/g creat) 14 01/09/2018 07:23 AM   Microalbumin,urine random 1.02 01/09/2018 07:23 AM   LDL, calculated 81.8 05/31/2018 09:03 AM   Creatinine 1.06 05/31/2018 09:03 AM      Lab Results  Component Value Date/Time   Cholesterol, total 179 05/31/2018 09:03 AM   HDL Cholesterol 71 (H) 05/31/2018 09:03 AM   LDL, calculated 81.8 05/31/2018 09:03 AM   Triglyceride 131 05/31/2018 09:03 AM   CHOL/HDL Ratio 2.5 05/31/2018 09:03 AM     Lab Results   Component Value Date/Time    Sodium 142 05/31/2018 09:03 AM    Potassium 4.3 05/31/2018 09:03 AM    Chloride 105 05/31/2018 09:03 AM    CO2 27 05/31/2018 09:03 AM    Anion gap 10 05/31/2018 09:03 AM    Glucose 99 05/31/2018 09:03 AM    BUN 14 05/31/2018 09:03 AM    Creatinine 1.06 05/31/2018 09:03 AM    BUN/Creatinine ratio 13 05/31/2018 09:03 AM    GFR est AA >60 05/31/2018 09:03 AM    GFR est non-AA 53 (L) 05/31/2018 09:03 AM    Calcium 9.2 05/31/2018 09:03 AM    Bilirubin, total 0.7 05/31/2018 09:03 AM    ALT (SGPT) 18 05/31/2018 09:03 AM    AST (SGOT) 19 05/31/2018 09:03 AM    Alk.  phosphatase 74 05/31/2018 09:03 AM    Protein, total 7.2 05/31/2018 09:03 AM    Albumin 3.8 05/31/2018 09:03 AM    Globulin 3.4 05/31/2018 09:03 AM    A-G Ratio 1.1 05/31/2018 09:03 AM         Review of Systems  Constitutional: negative  Eyes: negative  Ears, nose, mouth, throat, and face: negative  Respiratory: negative  Cardiovascular: negative  Gastrointestinal: negative  Genitourinary:negative  Integument/breast: positive for right arm lump  Musculoskeletal:negative  Neurological: negative    Objective:     Visit Vitals    /74 (BP 1 Location: Left arm, BP Patient Position: Sitting)    Pulse (!) 104    Temp 98.8 °F (37.1 °C) (Oral)    Ht 5' 3\" (1.6 m)    Wt 202 lb 9.6 oz (91.9 kg)    SpO2 100%  BMI 35.89 kg/m2     Appearance: alert, well appearing, and in no distress. Exam: fundi not indicated, heart sounds normal rate, regular rhythm, normal S1, S2, no murmurs, rubs, clicks or gallops, chest clear, no hepatosplenomegaly, no carotid bruits, feet: warm, good capillary refill and normal DP and PT pulses  Lab review: labs reviewed, I note that glycosylated hemoglobin abnormal .  Skin: Soft tissue mass to right forearm. Assessment/Plan:     diabetes needs improvement, needs to follow diet more regularly, hypertension stable, hyperlipidemia stable. Diabetic issues reviewed with her: low cholesterol diet, weight control and daily exercise discussed, foot care discussed and Podiatry visits discussed, annual eye examinations at Ophthalmology discussed, glycohemoglobin and other lab monitoring discussed, long term diabetic complications discussed and labs immediately prior to next visit. ICD-10-CM ICD-9-CM    1. Controlled diabetes mellitus type 2 with complications, unspecified whether long term insulin use (HCC) E11.8 250.90 REFERRAL TO OPTOMETRY      metFORMIN (GLUCOPHAGE) 1,000 mg tablet      glipiZIDE (GLUCOTROL) 10 mg tablet   2. HTN, goal below 130/80 I10 401.9 lisinopril (PRINIVIL, ZESTRIL) 20 mg tablet   3. Hyperlipidemia with target LDL less than 100 E78.5 272.4    4. Breast cancer screening Z12.31 W91.82 REFERRAL TO PUBLIC HEALTH   5. Broken or cracked tooth, nontraumatic K03.81 521.81 REFERRAL TO DENTISTRY   6. Lipoma of right upper extremity D17.21 214.8    7. Anemia, unspecified type D64.9 285.9 ferrous sulfate 325 mg (65 mg iron) tablet     Diagnoses and all orders for this visit:    1. Controlled diabetes mellitus type 2 with complications, unspecified whether long term insulin use (HCC)  -     REFERRAL TO OPTOMETRY  -     metFORMIN (GLUCOPHAGE) 1,000 mg tablet; Take 1 Tab by mouth two (2) times daily (with meals). -     glipiZIDE (GLUCOTROL) 10 mg tablet;  Take 1 Tab by mouth two (2) times a day. 2. HTN, goal below 130/80  -     lisinopril (PRINIVIL, ZESTRIL) 20 mg tablet; Take 1 Tab by mouth daily. 3. Hyperlipidemia with target LDL less than 100    4. Breast cancer screening  -     REFERRAL TO PUBLIC HEALTH    5. Broken or cracked tooth, nontraumatic  -     REFERRAL TO DENTISTRY    6. Lipoma of right upper extremity    7. Anemia, unspecified type  -     ferrous sulfate 325 mg (65 mg iron) tablet; Take 1 Tab by mouth Daily (before breakfast). Indications: Iron Deficiency Anemia    Other orders  -     BASIC METABOLIC PANEL; Future  -     HEMOGLOBIN A1C; Future    The current medical regimen is effective;  continue present plan and medications. Follow-up Disposition:  Return in about 4 months (around 10/11/2018), or if symptoms worsen or fail to improve.

## 2018-06-21 ENCOUNTER — CLINICAL SUPPORT (OUTPATIENT)
Dept: FAMILY MEDICINE CLINIC | Age: 63
End: 2018-06-21

## 2018-06-21 VITALS
OXYGEN SATURATION: 100 % | HEART RATE: 100 BPM | HEIGHT: 63 IN | DIASTOLIC BLOOD PRESSURE: 85 MMHG | SYSTOLIC BLOOD PRESSURE: 123 MMHG | RESPIRATION RATE: 20 BRPM | WEIGHT: 205 LBS | BODY MASS INDEX: 36.32 KG/M2 | TEMPERATURE: 98.1 F

## 2018-06-21 DIAGNOSIS — K04.7 DENTAL ABSCESS: Primary | ICD-10-CM

## 2018-09-26 ENCOUNTER — LAB ONLY (OUTPATIENT)
Dept: FAMILY MEDICINE CLINIC | Age: 63
End: 2018-09-26

## 2018-09-26 ENCOUNTER — HOSPITAL ENCOUNTER (OUTPATIENT)
Dept: LAB | Age: 63
Discharge: HOME | End: 2018-09-26

## 2018-09-26 DIAGNOSIS — E11.8 CONTROLLED DIABETES MELLITUS TYPE 2 WITH COMPLICATIONS, UNSPECIFIED WHETHER LONG TERM INSULIN USE (HCC): ICD-10-CM

## 2018-09-26 DIAGNOSIS — E11.8 CONTROLLED DIABETES MELLITUS TYPE 2 WITH COMPLICATIONS, UNSPECIFIED WHETHER LONG TERM INSULIN USE (HCC): Primary | ICD-10-CM

## 2018-09-26 DIAGNOSIS — E83.42 HYPOMAGNESEMIA: ICD-10-CM

## 2018-09-26 DIAGNOSIS — I10 HTN, GOAL BELOW 130/80: ICD-10-CM

## 2018-09-26 LAB
ANION GAP SERPL CALC-SCNC: 7 MMOL/L (ref 3–18)
BUN SERPL-MCNC: 21 MG/DL (ref 7–18)
BUN/CREAT SERPL: 18 (ref 12–20)
CALCIUM SERPL-MCNC: 9 MG/DL (ref 8.5–10.1)
CHLORIDE SERPL-SCNC: 107 MMOL/L (ref 100–108)
CO2 SERPL-SCNC: 28 MMOL/L (ref 21–32)
CREAT SERPL-MCNC: 1.16 MG/DL (ref 0.6–1.3)
EST. AVERAGE GLUCOSE BLD GHB EST-MCNC: 157 MG/DL
GLUCOSE SERPL-MCNC: 132 MG/DL (ref 74–99)
HBA1C MFR BLD: 7.1 % (ref 4.2–5.6)
POTASSIUM SERPL-SCNC: 4.2 MMOL/L (ref 3.5–5.5)
SODIUM SERPL-SCNC: 142 MMOL/L (ref 136–145)

## 2018-09-26 PROCEDURE — 83036 HEMOGLOBIN GLYCOSYLATED A1C: CPT | Performed by: NURSE PRACTITIONER

## 2018-09-26 PROCEDURE — 80048 BASIC METABOLIC PNL TOTAL CA: CPT | Performed by: NURSE PRACTITIONER

## 2018-10-04 ENCOUNTER — TELEPHONE (OUTPATIENT)
Dept: FAMILY MEDICINE CLINIC | Age: 63
End: 2018-10-04

## 2018-10-08 ENCOUNTER — OFFICE VISIT (OUTPATIENT)
Dept: FAMILY MEDICINE CLINIC | Age: 63
End: 2018-10-08

## 2018-10-08 VITALS
HEIGHT: 63 IN | DIASTOLIC BLOOD PRESSURE: 85 MMHG | SYSTOLIC BLOOD PRESSURE: 137 MMHG | OXYGEN SATURATION: 98 % | BODY MASS INDEX: 36.57 KG/M2 | HEART RATE: 98 BPM | WEIGHT: 206.4 LBS | TEMPERATURE: 98.8 F

## 2018-10-08 DIAGNOSIS — E11.8 CONTROLLED DIABETES MELLITUS TYPE 2 WITH COMPLICATIONS, UNSPECIFIED WHETHER LONG TERM INSULIN USE (HCC): Primary | ICD-10-CM

## 2018-10-08 DIAGNOSIS — I10 HTN, GOAL BELOW 130/80: ICD-10-CM

## 2018-10-08 DIAGNOSIS — G89.29 CHRONIC PAIN OF RIGHT KNEE: ICD-10-CM

## 2018-10-08 DIAGNOSIS — M25.561 CHRONIC PAIN OF RIGHT KNEE: ICD-10-CM

## 2018-10-08 DIAGNOSIS — E83.42 HYPOMAGNESEMIA: ICD-10-CM

## 2018-10-08 DIAGNOSIS — Z23 ENCOUNTER FOR IMMUNIZATION: ICD-10-CM

## 2018-10-08 RX ORDER — GLIPIZIDE 10 MG/1
10 TABLET ORAL 2 TIMES DAILY
Qty: 60 TAB | Refills: 5 | Status: SHIPPED | OUTPATIENT
Start: 2018-10-08 | End: 2019-06-12 | Stop reason: SDUPTHER

## 2018-10-08 RX ORDER — MELOXICAM 7.5 MG/1
TABLET ORAL
Qty: 30 TAB | Refills: 0 | Status: SHIPPED | OUTPATIENT
Start: 2018-10-08 | End: 2018-11-20 | Stop reason: SDUPTHER

## 2018-10-08 RX ORDER — LISINOPRIL 20 MG/1
20 TABLET ORAL DAILY
Qty: 30 TAB | Refills: 5 | Status: SHIPPED | OUTPATIENT
Start: 2018-10-08 | End: 2019-06-12 | Stop reason: SDUPTHER

## 2018-10-08 RX ORDER — LANOLIN ALCOHOL/MO/W.PET/CERES
400 CREAM (GRAM) TOPICAL DAILY
Qty: 30 TAB | Refills: 11 | Status: SHIPPED | OUTPATIENT
Start: 2018-10-08

## 2018-10-08 RX ORDER — METFORMIN HYDROCHLORIDE 1000 MG/1
1000 TABLET ORAL 2 TIMES DAILY WITH MEALS
Qty: 60 TAB | Refills: 5 | Status: SHIPPED | OUTPATIENT
Start: 2018-10-08 | End: 2019-06-12 | Stop reason: SDUPTHER

## 2018-10-08 NOTE — PROGRESS NOTES
[facility]  [address] [facility], [address]  [phone] office/[phone] fax      10/8/2018    Reason for visit:   Chief Complaint   Patient presents with    Follow Up Chronic Condition    Hypertension    Diabetes    Immunization/Injection     influenza       Patient: Maria Luz Mccollum, 1955, [id number]       Primary MD: Abhishek Jose NP    Subjective:   Maria Luz Mccollum, a 58 y.o. female, who presents for Follow Up Chronic Condition; Hypertension; Diabetes; and Immunization/Injection (influenza). Pt reports she is taking medications as prescribed and doing well. She reported some hypoglycemia 1 month ago, however that has resolved since she's started eating her meals regularly.        Follow Up Chronic Condition     Hypertension      Diabetes     Immunization/Injection          Past Medical History:   Diagnosis Date    Anemia     Arthritis     Back pain     Diabetes ([facility]     Diabetes mellitus type 2, controlled ([facility] 6/30/2016    Diabetic eye exam ([facility] 06/22/2018    trace bilateral non prolif retinopathy Cataract OD    Diverticulosis of sigmoid colon 02/02/2018    High cholesterol     Hypertension     Right arm weakness 3/3/15    pending EMG 4/22/15    Right knee DJD 1980    Cortisone shots/Mobic/ original injury    Right sciatic nerve pain 3/3/15    on Dr. Luis Aguilar Spider bite     brown recluse       Past Surgical History:   Procedure Laterality Date    COLONOSCOPY N/A 2/2/2018    COLONOSCOPY with Polypectomy performed by Melly Concepcion MD at [facility] ENDOSCOPY f/u 5 years    DEBRIDE NECROTIC SKIN/ TISSUE, ABD WALL Left 2002    spider bite     HX CHOLECYSTECTOMY  1986       Social History     Social History    Marital status:      Spouse name: N/A    Number of children: N/A    Years of education: N/A     Occupational History    A [employer]     full time     Social History Main Topics    Smoking status: Never Smoker  Smokeless tobacco: Never Used    Alcohol use No    Drug use: No    Sexual activity: No     Other Topics Concern    Not on file     Social History Narrative       Allergies   Allergen Reactions    Codeine Nausea and Vomiting    Januvia [Sitagliptin] Nausea Only and Myalgia    Pcn [Penicillins] Swelling       Current Outpatient Prescriptions on File Prior to Visit   Medication Sig Dispense Refill    cyclobenzaprine (FLEXERIL) 10 mg tablet TAKE 1 TABLET BY MOUTH NIGHTLY FOR MUSCLE SPASM 30 Tab 2    ferrous sulfate 325 mg (65 mg iron) tablet Take 1 Tab by mouth Daily (before breakfast). Indications: Iron Deficiency Anemia 30 Tab 11    acetaminophen (TYLENOL ARTHRITIS PAIN) 650 mg CR tablet Take 1 Tab by mouth every eight (8) hours as needed for Pain. Indications: Arthritic Pain      Calcium Citrate-Vitamin D3 500 mg calcium -400 unit chew Take 1 Tab by mouth two (2) times a day. Indications: VITAMIN D DEFICIENCY 60 Tab 11    pitavastatin (LIVALO) 2 mg tablet Take 1 Tab by mouth daily. 90 Tab 0    OTHER Alphabetic vitamins take as directed       No current facility-administered medications on file prior to visit. Review of Systems   Constitutional: Negative. HENT: Negative. Eyes: Negative. Respiratory: Negative. Cardiovascular: Negative. Gastrointestinal: Negative. Genitourinary: Negative. Musculoskeletal: Positive for joint pain (Right knee). Neurological: Negative. Psychiatric/Behavioral: Negative.         Objective:   Visit Vitals    /85 (BP 1 Location: Left arm, BP Patient Position: Sitting)    Pulse 98    Temp 98.8 °F (37.1 °C) (Oral)    Ht 5' 3\" (1.6 m)    Wt 206 lb 6.4 oz (93.6 kg)    SpO2 98%    BMI 36.56 kg/m2      Wt Readings from Last 3 Encounters:   10/08/18 206 lb 6.4 oz (93.6 kg)   06/21/18 205 lb (93 kg)   06/11/18 202 lb 9.6 oz (91.9 kg)     Lab Results   Component Value Date/Time    Glucose 132 (H) 09/26/2018 08:59 AM    Glucose (POC) 125 (H) 02/02/2018 11:03 AM       Pertinent Lab Results:    Physical Exam   Constitutional: She is oriented to person, place, and time. She appears well-developed and well-nourished. HENT:   Head: Normocephalic. Eyes: Pupils are equal, round, and reactive to light. Neck: Normal range of motion. Neck supple. No JVD present. Carotid bruit is not present. Cardiovascular: Normal rate, regular rhythm, normal heart sounds and intact distal pulses. No murmur heard. Pulmonary/Chest: Effort normal and breath sounds normal. No respiratory distress. Abdominal: Soft. Bowel sounds are normal.   Musculoskeletal: Normal range of motion. Right knee: She exhibits normal range of motion, no swelling, no effusion, no ecchymosis, no deformity, no laceration, no erythema, normal alignment, no LCL laxity, normal patellar mobility, no bony tenderness, normal meniscus and no MCL laxity. Tenderness found. Patellar tendon tenderness noted. Neurological: She is alert and oriented to person, place, and time. She has normal reflexes. Skin: Skin is warm and dry. Psychiatric: She has a normal mood and affect. Her behavior is normal.   Vitals reviewed. ICD-10-CM ICD-9-CM    1. Controlled diabetes mellitus type 2 with complications, unspecified whether long term insulin use (HCC) E11.8 250.90 metFORMIN (GLUCOPHAGE) 1,000 mg tablet      glipiZIDE (GLUCOTROL) 10 mg tablet      HEMOGLOBIN A1C WITH EAG      METABOLIC PANEL, COMPREHENSIVE      LIPID PANEL      MICROALBUMIN, UR, RAND W/ MICROALB/CREAT RATIO      MAGNESIUM   2. Hypomagnesemia E83.42 275.2 magnesium oxide (MAG-OX) 400 mg tablet      MAGNESIUM   3. HTN, goal below 130/80 I10 401.9 lisinopril (PRINIVIL, ZESTRIL) 20 mg tablet   4. Chronic pain of right knee M25.561 719.46 meloxicam (MOBIC) 7.5 mg tablet    G89.29 338.29    5.  Encounter for immunization Z23 V03.89 INFLUENZA VIRUS VAC QUAD,SPLIT,PRESV FREE SYRINGE IM     Diagnoses and all orders for this visit: 1. Controlled diabetes mellitus type 2 with complications, unspecified whether long term insulin use (HCC)  -     metFORMIN (GLUCOPHAGE) 1,000 mg tablet; Take 1 Tab by mouth two (2) times daily (with meals). -     glipiZIDE (GLUCOTROL) 10 mg tablet; Take 1 Tab by mouth two (2) times a day. -     HEMOGLOBIN A1C WITH EAG; Future  -     METABOLIC PANEL, COMPREHENSIVE; Future  -     LIPID PANEL; Future  -     MICROALBUMIN, UR, RAND W/ MICROALB/CREAT RATIO; Future  -     MAGNESIUM; Future    2. Hypomagnesemia  -     magnesium oxide (MAG-OX) 400 mg tablet; Take 1 Tab by mouth daily. -     MAGNESIUM; Future    3. HTN, goal below 130/80  -     lisinopril (PRINIVIL, ZESTRIL) 20 mg tablet; Take 1 Tab by mouth daily. 4. Chronic pain of right knee  -     meloxicam (MOBIC) 7.5 mg tablet; 1-2 tabs daily as needed for knee pain. 5. Encounter for immunization  -     Influenza virus vaccine (QUADRIVALENT PRES FREE SYRINGE) IM (75970)      Follow-up Disposition:  Return in about 5 months (around 3/8/2019), or if symptoms worsen or fail to improve.     Call [facility] for Financial Assistance    Medications Discontinued During This Encounter   Medication Reason    metFORMIN (GLUCOPHAGE) 1,000 mg tablet Reorder    magnesium oxide (MAG-OX) 400 mg tablet Reorder    lisinopril (PRINIVIL, ZESTRIL) 20 mg tablet Reorder    glipiZIDE (GLUCOTROL) 10 mg tablet Reorder

## 2018-10-08 NOTE — PROGRESS NOTES
Aftab Paniagua is a 58 y.o. female who presents for routine immunizations. She denies any symptoms , reactions or allergies that would exclude them from being immunized today. Risks and adverse reactions were discussed and the VIS was given to them. All questions were addressed. She was observed for 15 min post injection. There were no reactions observed. Leila Rubalcava LPN

## 2018-10-08 NOTE — MR AVS SNAPSHOT
[address] [facility] [id number] [phone] Patient: Alton Miller MRN: [id number] :1955 Visit Information Date & Time Provider Department Dept. Phone Encounter #  
 10/8/2018 10:00 AM Ivonne Mcdermott NP  [facility] [id number] Follow-up Instructions Return in about 5 months (around 3/8/2019), or if symptoms worsen or fail to improve. Your Appointments 3/4/2019 10:00 AM  
Follow Up with Ivonne Mdcermott NP [address] [facility] ([address]) Appt Note: 5 month followup [address] [facility] [phone]  
[address] [facility] [id number] Upcoming Health Maintenance Date Due DTaP/Tdap/Td series (1 - Tdap) 10/16/1976 Shingrix Vaccine Age 50> (1 of 2) 10/16/2005 BREAST CANCER SCRN MAMMOGRAM 10/16/2005 Influenza Age 5 to Adult 2018 Pneumococcal 19-64 Medium Risk (1 of 1 - PPSV23) 2019* FOBT Q 1 YEAR AGE 50-75 2018 MICROALBUMIN Q1 2019 HEMOGLOBIN A1C Q6M 3/26/2019 LIPID PANEL Q1 2019 FOOT EXAM Q1 2019 EYE EXAM RETINAL OR DILATED Q1 2019 PAP AKA CERVICAL CYTOLOGY 2020 *Topic was postponed. The date shown is not the original due date. Allergies as of 10/8/2018  Review Complete On: 10/8/2018 By: Jacob Leon. Ruby Gutierrez LPN Severity Noted Reaction Type Reactions Codeine  2012    Nausea and Vomiting Januvia [Sitagliptin]  2014   Side Effect Nausea Only, Myalgia Pcn [Penicillins]  2012    Swelling Current Immunizations  Reviewed on 3/2/2017 Name Date Influenza Vaccine 2017 Influenza Vaccine (Quad) PF 10/8/2018 10:15 AM, 3/2/2017 Influenza Vaccine Split 2012 Not reviewed this visit You Were Diagnosed With   
  
 Codes Comments Controlled diabetes mellitus type 2 with complications, unspecified whether long term insulin use (HCC)    -  Primary ICD-10-CM: E11.8 ICD-9-CM: 250.90 Hypomagnesemia     ICD-10-CM: X61.24 
ICD-9-CM: 275.2 HTN, goal below 130/80     ICD-10-CM: I10 
ICD-9-CM: 401.9 Chronic pain of right knee     ICD-10-CM: M25.561, G89.29 ICD-9-CM: 719.46, 338.29 Encounter for immunization     ICD-10-CM: E18 ICD-9-CM: V03.89 Vitals BP Pulse Temp Height(growth percentile) Weight(growth percentile) SpO2  
 137/85 (BP 1 Location: Left arm, BP Patient Position: Sitting) 98 98.8 °F (37.1 °C) (Oral) 5' 3\" (1.6 m) 206 lb 6.4 oz (93.6 kg) 98% BMI OB Status Smoking Status 36.56 kg/m2 Postmenopausal Never Smoker Vitals History BMI and BSA Data Body Mass Index Body Surface Area  
 36.56 kg/m 2 2.04 m 2 Preferred Pharmacy Pharmacy Name Phone [address] [facility] [phone] Your Updated Medication List  
  
   
This list is accurate as of 10/8/18 10:30 AM.  Always use your most recent med list.  
  
  
  
  
 Calcium Citrate-Vitamin D3 500 mg calcium -400 unit Chew Take 1 Tab by mouth two (2) times a day. Indications: VITAMIN D DEFICIENCY  
  
 cyclobenzaprine 10 mg tablet Commonly known as:  FLEXERIL  
TAKE 1 TABLET BY MOUTH NIGHTLY FOR MUSCLE SPASM  
  
 ferrous sulfate 325 mg (65 mg iron) tablet Take 1 Tab by mouth Daily (before breakfast). Indications: Iron Deficiency Anemia  
  
 glipiZIDE 10 mg tablet Commonly known as:  Jaqueline [place] Take 1 Tab by mouth two (2) times a day. lisinopril 20 mg tablet Commonly known as:  Hilda Vichy Take 1 Tab by mouth daily. magnesium oxide 400 mg tablet Commonly known as:  MAG-OX Take 1 Tab by mouth daily. meloxicam 7.5 mg tablet Commonly known as:  MOBIC  
1-2 tabs daily as needed for knee pain. metFORMIN 1,000 mg tablet Commonly known as:  GLUCOPHAGE Take 1 Tab by mouth two (2) times daily (with meals). OTHER Alphabetic vitamins take as directed  
  
 pitavastatin calcium 2 mg tablet Commonly known as:  LIVALO Take 1 Tab by mouth daily. TYLENOL ARTHRITIS PAIN 650 mg Renee Adamson Generic drug:  acetaminophen Take 1 Tab by mouth every eight (8) hours as needed for Pain. Indications: Arthritic Pain Prescriptions Sent to Pharmacy Refills  
 metFORMIN (GLUCOPHAGE) 1,000 mg tablet 5 Sig: Take 1 Tab by mouth two (2) times daily (with meals). Class: Normal  
 Pharmacy: [address] [place] [address]. Ph #: [phone] Route: Oral  
 magnesium oxide (MAG-OX) 400 mg tablet 11 Sig: Take 1 Tab by mouth daily. Class: Normal  
 Pharmacy: [address] [place] [address]. Ph #: [phone] Route: Oral  
 lisinopril (PRINIVIL, ZESTRIL) 20 mg tablet 5 Sig: Take 1 Tab by mouth daily. Class: Normal  
 Pharmacy: [address] [place] [address]. Ph #: [phone] Route: Oral  
 glipiZIDE (GLUCOTROL) 10 mg tablet 5 Sig: Take 1 Tab by mouth two (2) times a day. Class: Normal  
 Pharmacy: [address] [place] [address]. Ph #: [phone] Route: Oral  
 meloxicam (MOBIC) 7.5 mg tablet 0 Si-2 tabs daily as needed for knee pain. Class: Normal  
 Pharmacy: [address] [place] [address]. Ph #: [phone] We Performed the Following INFLUENZA VIRUS VAC QUAD,SPLIT,PRESV FREE SYRINGE IM [id number] CPT(R)] Follow-up Instructions Return in about 5 months (around 3/8/2019), or if symptoms worsen or fail to improve. To-Do List   
 2019 Lab:  HEMOGLOBIN A1C WITH EAG   
  
 2019 Lab:  LIPID PANEL   
  
 2019 Lab:  MAGNESIUM   
  
 2019 Lab: METABOLIC PANEL, COMPREHENSIVE   
  
 05/06/2019 Lab:  MICROALBUMIN, UR, RAND W/ MICROALB/CREAT RATIO Introducing [facility]! [facility] introduces Search123 patient portal. Now you can access parts of your medical record, email your doctor's office, and request medication refills online. 1. In your internet browser, go to [email or web address] 2. Click on the First Time User? Click Here link in the Sign In box. You will see the New Member Sign Up page. 3. Enter your Search123 Access Code exactly as it appears below. You will not need to use this code after youve completed the sign-up process. If you do not sign up before the expiration date, you must request a new code. · Search123 Access Code: [email or web address] Expires: 1/6/2019 10:11 AM 
 
4. Enter the last four digits of your Social Security Number (xxxx) and Date of Birth (mm/dd/yyyy) as indicated and click Submit. You will be taken to the next sign-up page. 5. Create a Search123 ID. This will be your Search123 login ID and cannot be changed, so think of one that is secure and easy to remember. 6. Create a Search123 password. You can change your password at any time. 7. Enter your Password Reset Question and Answer. This can be used at a later time if you forget your password. 8. Enter your e-mail address. You will receive e-mail notification when new information is available in [address] 9. Click Sign Up. You can now view and download portions of your medical record. 10. Click the Download Summary menu link to download a portable copy of your medical information. If you have questions, please visit the Frequently Asked Questions section of the Search123 website. Remember, Search123 is NOT to be used for urgent needs. For medical emergencies, dial 911. Now available from your iPhone and Android! Please provide this summary of care documentation to your next provider. Your primary care clinician is listed as Kuhn Proper. If you have any questions after today's visit, please call [phone].

## 2018-10-12 DIAGNOSIS — M62.830 SPASM OF LUMBAR PARASPINOUS MUSCLE: ICD-10-CM

## 2018-10-16 RX ORDER — CYCLOBENZAPRINE HCL 10 MG
TABLET ORAL
Qty: 30 TAB | Refills: 2 | Status: SHIPPED | OUTPATIENT
Start: 2018-10-16 | End: 2019-01-19 | Stop reason: SDUPTHER

## 2018-11-20 DIAGNOSIS — M25.561 CHRONIC PAIN OF RIGHT KNEE: ICD-10-CM

## 2018-11-20 DIAGNOSIS — G89.29 CHRONIC PAIN OF RIGHT KNEE: ICD-10-CM

## 2018-11-20 RX ORDER — MELOXICAM 7.5 MG/1
TABLET ORAL
Qty: 30 TAB | Refills: 6 | Status: SHIPPED | OUTPATIENT
Start: 2018-11-20 | End: 2019-06-12 | Stop reason: SDUPTHER

## 2019-02-13 DIAGNOSIS — M62.830 SPASM OF LUMBAR PARASPINOUS MUSCLE: ICD-10-CM

## 2019-02-13 RX ORDER — CYCLOBENZAPRINE HCL 10 MG
TABLET ORAL
Qty: 30 TAB | Refills: 3 | Status: SHIPPED | OUTPATIENT
Start: 2019-02-13 | End: 2019-06-12 | Stop reason: SDUPTHER

## 2019-02-19 ENCOUNTER — HOSPITAL ENCOUNTER (OUTPATIENT)
Dept: LAB | Age: 64
Discharge: HOME | End: 2019-02-19

## 2019-02-19 ENCOUNTER — LAB ONLY (OUTPATIENT)
Dept: FAMILY MEDICINE CLINIC | Age: 64
End: 2019-02-19

## 2019-02-19 DIAGNOSIS — E83.42 HYPOMAGNESEMIA: ICD-10-CM

## 2019-02-19 DIAGNOSIS — E11.8 CONTROLLED DIABETES MELLITUS TYPE 2 WITH COMPLICATIONS, UNSPECIFIED WHETHER LONG TERM INSULIN USE (HCC): ICD-10-CM

## 2019-02-19 DIAGNOSIS — Z01.89 ROUTINE LAB DRAW: Primary | ICD-10-CM

## 2019-02-19 LAB
ALBUMIN SERPL-MCNC: 4 G/DL (ref 3.4–5)
ALBUMIN/GLOB SERPL: 1.2 {RATIO} (ref 0.8–1.7)
ALP SERPL-CCNC: 92 U/L (ref 45–117)
ALT SERPL-CCNC: 21 U/L (ref 13–56)
ANION GAP SERPL CALC-SCNC: 9 MMOL/L (ref 3–18)
AST SERPL-CCNC: 23 U/L (ref 15–37)
BILIRUB SERPL-MCNC: 0.6 MG/DL (ref 0.2–1)
BUN SERPL-MCNC: 16 MG/DL (ref 7–18)
BUN/CREAT SERPL: 15 (ref 12–20)
CALCIUM SERPL-MCNC: 9.2 MG/DL (ref 8.5–10.1)
CHLORIDE SERPL-SCNC: 107 MMOL/L (ref 100–108)
CHOLEST SERPL-MCNC: 173 MG/DL
CO2 SERPL-SCNC: 26 MMOL/L (ref 21–32)
CREAT SERPL-MCNC: 1.09 MG/DL (ref 0.6–1.3)
CREAT UR-MCNC: 60.1 MG/DL (ref 30–125)
EST. AVERAGE GLUCOSE BLD GHB EST-MCNC: 160 MG/DL
GLOBULIN SER CALC-MCNC: 3.3 G/DL (ref 2–4)
GLUCOSE SERPL-MCNC: 93 MG/DL (ref 74–99)
HBA1C MFR BLD: 7.2 % (ref 4.2–5.6)
HDLC SERPL-MCNC: 70 MG/DL (ref 40–60)
HDLC SERPL: 2.5 {RATIO} (ref 0–5)
LDLC SERPL CALC-MCNC: 72.8 MG/DL (ref 0–100)
LIPID PROFILE,FLP: ABNORMAL
MAGNESIUM SERPL-MCNC: 1.8 MG/DL (ref 1.6–2.6)
MICROALBUMIN UR-MCNC: 0.66 MG/DL (ref 0–3)
MICROALBUMIN/CREAT UR-RTO: 11 MG/G (ref 0–30)
POTASSIUM SERPL-SCNC: 4.7 MMOL/L (ref 3.5–5.5)
PROT SERPL-MCNC: 7.3 G/DL (ref 6.4–8.2)
SODIUM SERPL-SCNC: 142 MMOL/L (ref 136–145)
TRIGL SERPL-MCNC: 151 MG/DL (ref ?–150)
VLDLC SERPL CALC-MCNC: 30.2 MG/DL

## 2019-02-19 PROCEDURE — 83036 HEMOGLOBIN GLYCOSYLATED A1C: CPT

## 2019-02-19 PROCEDURE — 82043 UR ALBUMIN QUANTITATIVE: CPT

## 2019-02-19 PROCEDURE — 80053 COMPREHEN METABOLIC PANEL: CPT

## 2019-02-19 PROCEDURE — 80061 LIPID PANEL: CPT

## 2019-02-19 PROCEDURE — 83735 ASSAY OF MAGNESIUM: CPT

## 2019-03-04 ENCOUNTER — OFFICE VISIT (OUTPATIENT)
Dept: FAMILY MEDICINE CLINIC | Age: 64
End: 2019-03-04

## 2019-03-04 VITALS
DIASTOLIC BLOOD PRESSURE: 73 MMHG | HEART RATE: 104 BPM | RESPIRATION RATE: 20 BRPM | OXYGEN SATURATION: 100 % | HEIGHT: 63 IN | TEMPERATURE: 98.1 F | SYSTOLIC BLOOD PRESSURE: 116 MMHG | BODY MASS INDEX: 36.29 KG/M2 | WEIGHT: 204.8 LBS

## 2019-03-04 DIAGNOSIS — M17.11 PRIMARY OSTEOARTHRITIS OF RIGHT KNEE: ICD-10-CM

## 2019-03-04 DIAGNOSIS — E11.9 TYPE 2 DIABETES MELLITUS WITHOUT COMPLICATION, WITHOUT LONG-TERM CURRENT USE OF INSULIN (HCC): Primary | ICD-10-CM

## 2019-03-04 DIAGNOSIS — N81.10 VAGINAL PROLAPSE: ICD-10-CM

## 2019-03-04 DIAGNOSIS — Z12.39 BREAST CANCER SCREENING: ICD-10-CM

## 2019-03-04 NOTE — PROGRESS NOTES
03/04/19    PCP: Kosta Evans NP    Chief Complaint   Patient presents with    Follow Up Chronic Condition        HISTORY OF PRESENT ILLNESS  Yennifer Mann  is a 61 y.o. female whom presents for Follow Up Chronic Condition    Follow Up Chronic Condition       Diabetes Mellitus:  She has diabetes mellitus, and  hyperlipidemia. Diabetic ROS - medication compliance: compliant most of the time, diabetic diet compliance: noncompliant much of the time, home glucose monitoring: is performed regularly, Patient did not bring glucose log to this visit. , further diabetic ROS: no polyuria or polydipsia, no chest pain, dyspnea or TIA's, no numbness, tingling or pain in extremities. Lab review: labs reviewed, I note that glycosylated hemoglobin mildly abnormal but acceptable. Osteoarthritis and Chronic Pain:  Patient has osteoarthritis, primarily affecting the knees. Symptoms onset: problem is longstanding. Rheumatological ROS: stable, mild-to-moderate joint symptoms intermittently, reasonably well controlled by PRN meds. Response to treatment plan: stable. Patient Active Problem List    Diagnosis Date Noted    [facility] 06/11/2018    Mild chronic anemia 06/30/2016    Diabetes mellitus type 2, controlled ([facility] 06/30/2016    BMI 36.0-36.9,adult 10/14/2015    Renovascular hypertension 06/01/2015    Anemia 03/03/2015    Hypomagnesemia 03/03/2015    Internal hemorrhoid 03/03/2015    Adult BMI 40.0-44.9 kg/sq m ([facility] 03/03/2015    Hyperlipidemia with target LDL less than 100 05/06/2014    Diverticulitis 12/10/2013    Bell's palsy 07/07/2013    GERD (gastroesophageal reflux disease) 11/28/2012     Current Outpatient Medications   Medication Sig Dispense Refill    cyclobenzaprine (FLEXERIL) 10 mg tablet TAKE ONE TABLET NIGHTLY AS NEEDED FOR MUSCLE SPASM 30 Tab 3    meloxicam (MOBIC) 7.5 mg tablet 1-2 tabs daily as needed for knee pain.  30 Tab 6    metFORMIN (GLUCOPHAGE) 1,000 mg tablet Take 1 Tab by mouth two (2) times daily (with meals). 60 Tab 5    magnesium oxide (MAG-OX) 400 mg tablet Take 1 Tab by mouth daily. 30 Tab 11    lisinopril (PRINIVIL, ZESTRIL) 20 mg tablet Take 1 Tab by mouth daily. 30 Tab 5    glipiZIDE (GLUCOTROL) 10 mg tablet Take 1 Tab by mouth two (2) times a day. 60 Tab 5    ferrous sulfate 325 mg (65 mg iron) tablet Take 1 Tab by mouth Daily (before breakfast). Indications: Iron Deficiency Anemia 30 Tab 11    acetaminophen (TYLENOL ARTHRITIS PAIN) 650 mg CR tablet Take 1 Tab by mouth every eight (8) hours as needed for Pain. Indications: Arthritic Pain      Calcium Citrate-Vitamin D3 500 mg calcium -400 unit chew Take 1 Tab by mouth two (2) times a day. Indications: VITAMIN D DEFICIENCY 60 Tab 11    pitavastatin (LIVALO) 2 mg tablet Take 1 Tab by mouth daily.  80 Tab 0    OTHER Alphabetic vitamins take as directed       Allergies   Allergen Reactions    Codeine Nausea and Vomiting    Januvia [Sitagliptin] Nausea Only and Myalgia    Pcn [Penicillins] Swelling     Past Medical History:   Diagnosis Date    Anemia     Arthritis     Back pain     Diabetes ([facility]     Diabetes mellitus type 2, controlled ([facility] 6/30/2016    Diabetic eye exam ([facility] 06/22/2018    trace bilateral non prolif retinopathy Cataract OD    Diverticulosis of sigmoid colon 02/02/2018    High cholesterol     Hypertension     Right arm weakness 3/3/15    pending EMG 4/22/15    Right knee DJD 1980    Cortisone shots/Mobic/ original injury    Right sciatic nerve pain 3/3/15    on Dr. Megan Aguilar Spider bite     brown recluse     Past Surgical History:   Procedure Laterality Date    COLONOSCOPY N/A 2/2/2018    COLONOSCOPY with Polypectomy performed by Parisa Britt MD at [address] [facility] ENDOSCOPY f/u 5 years    DEBRIDE NECROTIC SKIN/ TISSUE, ABD WALL Left 2002    spider bite     HX CHOLECYSTECTOMY  1986     Family History   Problem Relation Age of Onset    Hypertension Mother     Diabetes Mother     Colon Cancer Father 72    Blindness Father         trauma    Diabetes Sister     Diabetes Brother      Social History     Tobacco Use    Smoking status: Never Smoker    Smokeless tobacco: Never Used   Substance Use Topics    Alcohol use: No       Review of Systems   Constitutional: Negative. HENT: Negative. Eyes: Negative. Respiratory: Negative. Cardiovascular: Negative. Gastrointestinal: Negative. Genitourinary: Negative. Reports Vaginal Prolapse bothers her intermittently. 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 Skin: Negative. Neurological: Negative. Psychiatric/Behavioral: Negative. Visit Vitals  /73   Pulse (!) 104   Temp 98.1 °F (36.7 °C)   Resp 20   Ht 5' 3\" (1.6 m)   Wt 204 lb 12.8 oz (92.9 kg)   SpO2 100%   BMI 36.28 kg/m²       Pain Scale: 0 - No pain/10    Pain Location:      Physical Exam   Constitutional: She is oriented to person, place, and time and well-developed, well-nourished, and in no distress. HENT:   Head: Normocephalic. Eyes: Pupils are equal, round, and reactive to light. Neck: Normal range of motion. Neck supple. Cardiovascular: Normal rate, regular rhythm and normal heart sounds. Pulmonary/Chest: Effort normal and breath sounds normal.   Abdominal: Soft. Bowel sounds are normal.   Neurological: She is alert and oriented to person, place, and time. Skin: Skin is warm and dry. Psychiatric: Mood and affect normal.   Vitals reviewed.       Lab Results   Component Value Date/Time    WBC 6.7 05/31/2018 09:03 AM    HGB 10.5 (L) 05/31/2018 09:03 AM    HCT 34.2 (L) 05/31/2018 09:03 AM    PLATELET 198 01/19/3616 09:03 AM    MCV 89.8 05/31/2018 09:03 AM     Lab Results   Component Value Date/Time    Hemoglobin A1c 7.2 (H) 02/19/2019 07:06 AM    Hemoglobin A1c 7.1 (H) 09/26/2018 08:59 AM    Hemoglobin A1c 7.9 (H) 05/31/2018 09:03 AM    Glucose 93 02/19/2019 07:06 AM    Glucose (POC) 125 (H) 02/02/2018 11:03 AM    Microalbumin/Creat ratio (mg/g creat) 11 02/19/2019 07:06 AM    Microalbumin,urine random 0.66 02/19/2019 07:06 AM    LDL, calculated 72.8 02/19/2019 07:06 AM    Creatinine 1.09 02/19/2019 07:06 AM      Lab Results   Component Value Date/Time    Cholesterol, total 173 02/19/2019 07:06 AM    HDL Cholesterol 70 (H) 02/19/2019 07:06 AM    LDL, calculated 72.8 02/19/2019 07:06 AM    Triglyceride 151 (H) 02/19/2019 07:06 AM    CHOL/HDL Ratio 2.5 02/19/2019 07:06 AM     Lab Results   Component Value Date/Time    Sodium 142 02/19/2019 07:06 AM    Potassium 4.7 02/19/2019 07:06 AM    Chloride 107 02/19/2019 07:06 AM    CO2 26 02/19/2019 07:06 AM    Anion gap 9 02/19/2019 07:06 AM    Glucose 93 02/19/2019 07:06 AM    BUN 16 02/19/2019 07:06 AM    Creatinine 1.09 02/19/2019 07:06 AM    BUN/Creatinine ratio 15 02/19/2019 07:06 AM    GFR est AA >60 02/19/2019 07:06 AM    GFR est non-AA 51 (L) 02/19/2019 07:06 AM    Calcium 9.2 02/19/2019 07:06 AM    Bilirubin, total 0.6 02/19/2019 07:06 AM    ALT (SGPT) 21 02/19/2019 07:06 AM    AST (SGOT) 23 02/19/2019 07:06 AM    Alk. phosphatase 92 02/19/2019 07:06 AM    Protein, total 7.3 02/19/2019 07:06 AM    Albumin 4.0 02/19/2019 07:06 AM    Globulin 3.3 02/19/2019 07:06 AM    A-G Ratio 1.2 02/19/2019 07:06 AM       ASSESSMENT and PLAN    ICD-10-CM ICD-9-CM    1. Type 2 diabetes mellitus without complication, without long-term current use of insulin (HCC) E11.9 250.00 HEMOGLOBIN A1C WITH EAG   2. Vaginal prolapse N81.10 618.00     Patient would like to hold off on OB/GYN referral.   Encouraged Kegels and no heavy lifting. 3. Adult BMI 40.0-44.9 kg/sq m ([facility] Z68.41 V85.41    4. Breast cancer screening Z12.31 K84.23 REFERRAL TO PUBLIC HEALTH   5.  Primary osteoarthritis of right knee M17.11 715.16      Follow-up Disposition:  Return in about 3 months (around 6/4/2019), or if symptoms worsen or fail to improve. reviewed diet, exercise and weight control  cardiovascular risk and specific lipid/LDL goals reviewed  reviewed medications and side effects in detail  specific diabetic recommendations: low cholesterol diet, weight control and daily exercise discussed, foot care discussed and Podiatry visits discussed, annual eye examinations at Ophthalmology discussed, glycohemoglobin and other lab monitoring discussed, long term diabetic complications discussed and labs immediately prior to next visit      There are no discontinued medications. Written instructions followed our verbal discussion of all information discussed above, pending tests ordered and future goals/plans. Patient expressed understanding of current diagnosis, planned testing, follow up and if needed to contact the office for any questions or concerns prior to the next visit. Follow-up Disposition:  Return in about 3 months (around 6/4/2019), or if symptoms worsen or fail to improve.

## 2019-05-23 ENCOUNTER — HOSPITAL ENCOUNTER (OUTPATIENT)
Dept: LAB | Age: 64
Discharge: HOME | End: 2019-05-23

## 2019-05-23 ENCOUNTER — LAB ONLY (OUTPATIENT)
Dept: FAMILY MEDICINE CLINIC | Age: 64
End: 2019-05-23

## 2019-05-23 DIAGNOSIS — Z01.89 ROUTINE LAB DRAW: Primary | ICD-10-CM

## 2019-05-23 LAB — XX-LABCORP SPECIMEN COL,LCBCF: NORMAL

## 2019-05-23 PROCEDURE — 99001 SPECIMEN HANDLING PT-LAB: CPT

## 2019-05-24 LAB
EST. AVERAGE GLUCOSE BLD GHB EST-MCNC: 137 MG/DL
HBA1C MFR BLD: 6.4 % (ref 4.8–5.6)

## 2019-06-03 ENCOUNTER — HOSPITAL ENCOUNTER (OUTPATIENT)
Dept: GENERAL RADIOLOGY | Age: 64
Discharge: HOME | End: 2019-06-03
Payer: MEDICAID

## 2019-06-03 ENCOUNTER — HOSPITAL ENCOUNTER (OUTPATIENT)
Dept: LAB | Age: 64
Discharge: HOME | End: 2019-06-03
Payer: MEDICAID

## 2019-06-03 ENCOUNTER — HOSPITAL ENCOUNTER (OUTPATIENT)
Dept: LAB | Age: 64
Discharge: HOME | End: 2019-06-03

## 2019-06-03 ENCOUNTER — OFFICE VISIT (OUTPATIENT)
Dept: FAMILY MEDICINE CLINIC | Age: 64
End: 2019-06-03

## 2019-06-03 VITALS
HEART RATE: 121 BPM | TEMPERATURE: 98.6 F | RESPIRATION RATE: 20 BRPM | WEIGHT: 197.8 LBS | OXYGEN SATURATION: 97 % | DIASTOLIC BLOOD PRESSURE: 86 MMHG | HEIGHT: 63 IN | SYSTOLIC BLOOD PRESSURE: 125 MMHG | BODY MASS INDEX: 35.05 KG/M2

## 2019-06-03 DIAGNOSIS — R68.89 FLU-LIKE SYMPTOMS: ICD-10-CM

## 2019-06-03 DIAGNOSIS — E11.9 TYPE 2 DIABETES MELLITUS WITHOUT COMPLICATION, WITHOUT LONG-TERM CURRENT USE OF INSULIN (HCC): ICD-10-CM

## 2019-06-03 DIAGNOSIS — R00.0 TACHYCARDIA: Primary | ICD-10-CM

## 2019-06-03 DIAGNOSIS — I10 HTN, GOAL BELOW 130/80: ICD-10-CM

## 2019-06-03 DIAGNOSIS — R00.0 TACHYCARDIA: ICD-10-CM

## 2019-06-03 DIAGNOSIS — E78.5 DYSLIPIDEMIA: ICD-10-CM

## 2019-06-03 LAB
ALBUMIN SERPL-MCNC: 4.1 G/DL (ref 3.4–5)
ALBUMIN/GLOB SERPL: 1.1 {RATIO} (ref 0.8–1.7)
ALP SERPL-CCNC: 92 U/L (ref 45–117)
ALT SERPL-CCNC: 15 U/L (ref 13–56)
ANION GAP SERPL CALC-SCNC: 7 MMOL/L (ref 3–18)
APPEARANCE UR: ABNORMAL
AST SERPL-CCNC: 14 U/L (ref 15–37)
ATRIAL RATE: 118 BPM
BACTERIA URNS QL MICRO: ABNORMAL /HPF
BASOPHILS # BLD: 0 K/UL (ref 0–0.1)
BASOPHILS NFR BLD: 0 % (ref 0–2)
BILIRUB SERPL-MCNC: 0.5 MG/DL (ref 0.2–1)
BILIRUB UR QL: NEGATIVE
BUN SERPL-MCNC: 19 MG/DL (ref 7–18)
BUN/CREAT SERPL: 14 (ref 12–20)
CALCIUM SERPL-MCNC: 9.3 MG/DL (ref 8.5–10.1)
CALCULATED P AXIS, ECG09: 68 DEGREES
CALCULATED R AXIS, ECG10: -35 DEGREES
CALCULATED T AXIS, ECG11: 88 DEGREES
CHLORIDE SERPL-SCNC: 110 MMOL/L (ref 100–108)
CO2 SERPL-SCNC: 27 MMOL/L (ref 21–32)
COLOR UR: YELLOW
CREAT SERPL-MCNC: 1.33 MG/DL (ref 0.6–1.3)
DIAGNOSIS, 93000: NORMAL
DIFFERENTIAL METHOD BLD: ABNORMAL
EOSINOPHIL # BLD: 0 K/UL (ref 0–0.4)
EOSINOPHIL NFR BLD: 1 % (ref 0–5)
EPITH CASTS URNS QL MICRO: ABNORMAL /LPF (ref 0–5)
ERYTHROCYTE [DISTWIDTH] IN BLOOD BY AUTOMATED COUNT: 13 % (ref 11.6–14.5)
EST. AVERAGE GLUCOSE BLD GHB EST-MCNC: 140 MG/DL
FLUAV AG NPH QL IA: NEGATIVE
FLUBV AG NOSE QL IA: NEGATIVE
GLOBULIN SER CALC-MCNC: 3.8 G/DL (ref 2–4)
GLUCOSE SERPL-MCNC: 125 MG/DL (ref 74–99)
GLUCOSE UR STRIP.AUTO-MCNC: NEGATIVE MG/DL
HBA1C MFR BLD: 6.5 % (ref 4.2–5.6)
HCT VFR BLD AUTO: 38.1 % (ref 35–45)
HGB BLD-MCNC: 11.8 G/DL (ref 12–16)
HGB UR QL STRIP: NEGATIVE
KETONES UR QL STRIP.AUTO: NEGATIVE MG/DL
LEUKOCYTE ESTERASE UR QL STRIP.AUTO: ABNORMAL
LYMPHOCYTES # BLD: 2.7 K/UL (ref 0.9–3.6)
LYMPHOCYTES NFR BLD: 31 % (ref 21–52)
MCH RBC QN AUTO: 28.1 PG (ref 24–34)
MCHC RBC AUTO-ENTMCNC: 31 G/DL (ref 31–37)
MCV RBC AUTO: 90.7 FL (ref 74–97)
MONOCYTES # BLD: 0.6 K/UL (ref 0.05–1.2)
MONOCYTES NFR BLD: 7 % (ref 3–10)
MUCOUS THREADS URNS QL MICRO: ABNORMAL /LPF
NEUTS SEG # BLD: 5.3 K/UL (ref 1.8–8)
NEUTS SEG NFR BLD: 61 % (ref 40–73)
NITRITE UR QL STRIP.AUTO: POSITIVE
P-R INTERVAL, ECG05: 148 MS
PH UR STRIP: 5.5 [PH] (ref 5–8)
PLATELET # BLD AUTO: 374 K/UL (ref 135–420)
PMV BLD AUTO: 10.3 FL (ref 9.2–11.8)
POTASSIUM SERPL-SCNC: 4.3 MMOL/L (ref 3.5–5.5)
PROT SERPL-MCNC: 7.9 G/DL (ref 6.4–8.2)
PROT UR STRIP-MCNC: NEGATIVE MG/DL
Q-T INTERVAL, ECG07: 308 MS
QRS DURATION, ECG06: 76 MS
QTC CALCULATION (BEZET), ECG08: 431 MS
RBC # BLD AUTO: 4.2 M/UL (ref 4.2–5.3)
RBC #/AREA URNS HPF: ABNORMAL /HPF (ref 0–5)
SODIUM SERPL-SCNC: 144 MMOL/L (ref 136–145)
SP GR UR REFRACTOMETRY: 1 (ref 1–1.03)
T4 FREE SERPL-MCNC: 1.1 NG/DL (ref 0.7–1.5)
TSH SERPL DL<=0.05 MIU/L-ACNC: 0.95 UIU/ML (ref 0.36–3.74)
UROBILINOGEN UR QL STRIP.AUTO: 0.2 EU/DL (ref 0.2–1)
VENTRICULAR RATE, ECG03: 118 BPM
WBC # BLD AUTO: 8.7 K/UL (ref 4.6–13.2)
WBC URNS QL MICRO: ABNORMAL /HPF (ref 0–4)

## 2019-06-03 PROCEDURE — 85025 COMPLETE CBC W/AUTO DIFF WBC: CPT

## 2019-06-03 PROCEDURE — 81001 URINALYSIS AUTO W/SCOPE: CPT

## 2019-06-03 PROCEDURE — 83036 HEMOGLOBIN GLYCOSYLATED A1C: CPT

## 2019-06-03 PROCEDURE — 84443 ASSAY THYROID STIM HORMONE: CPT

## 2019-06-03 PROCEDURE — 84439 ASSAY OF FREE THYROXINE: CPT

## 2019-06-03 PROCEDURE — 87804 INFLUENZA ASSAY W/OPTIC: CPT

## 2019-06-03 PROCEDURE — 80053 COMPREHEN METABOLIC PANEL: CPT

## 2019-06-03 PROCEDURE — 93005 ELECTROCARDIOGRAM TRACING: CPT

## 2019-06-03 PROCEDURE — 71046 X-RAY EXAM CHEST 2 VIEWS: CPT

## 2019-06-03 NOTE — PROGRESS NOTES
06/03/19    PCP: Balbir Handy NP    Chief Complaint   Patient presents with    Follow Up Chronic Condition    Decreased Appetite    Fatigue        HISTORY OF PRESENT ILLNESS  Albino Kern  is a 61 y.o. female whom presents for Follow Up Chronic Condition; Decreased Appetite; and Fatigue         Patient presents today for chronic care management. She reports flu-like symptoms for 4 days. Flu    The history is provided by the patient. This is a new problem. The current episode started more than 2 days ago. The problem has been gradually improving. Patient reports a subjective fever - was not measured. Associated symptoms include sleepiness, congestion, headaches, muscle aches and cough. Pertinent negatives include no chest pain, no shortness of breath, no mental status change and no rash. Associated symptoms comments: Fatigue and chills. . She has tried nothing for the symptoms. Fatigue   The history is provided by the patient. This is a new problem. The current episode started more than 2 days ago. The problem occurs daily. The problem has not changed since onset. Associated symptoms include headaches. Pertinent negatives include no chest pain, no abdominal pain and no shortness of breath. Nothing aggravates the symptoms. Nothing relieves the symptoms. She has tried nothing for the symptoms.          Patient Active Problem List    Diagnosis Date Noted    Type 2 diabetes with nephropathy ([address] 06/11/2018    Mild chronic anemia 06/30/2016    Diabetes mellitus type 2, controlled ([address] 06/30/2016    BMI 36.0-36.9,adult 10/14/2015    Renovascular hypertension 06/01/2015    Anemia 03/03/2015    Hypomagnesemia 03/03/2015    Internal hemorrhoid 03/03/2015    Adult BMI 40.0-44.9 kg/sq m ([address] 03/03/2015    Hyperlipidemia with target LDL less than 100 05/06/2014    Diverticulitis 12/10/2013    Bell's palsy 07/07/2013    GERD (gastroesophageal reflux disease) 11/28/2012     Current Outpatient Medications   Medication Sig Dispense Refill    cyclobenzaprine (FLEXERIL) 10 mg tablet TAKE ONE TABLET NIGHTLY AS NEEDED FOR MUSCLE SPASM 30 Tab 3    meloxicam (MOBIC) 7.5 mg tablet 1-2 tabs daily as needed for knee pain. 30 Tab 6    metFORMIN (GLUCOPHAGE) 1,000 mg tablet Take 1 Tab by mouth two (2) times daily (with meals). 60 Tab 5    magnesium oxide (MAG-OX) 400 mg tablet Take 1 Tab by mouth daily. 30 Tab 11    lisinopril (PRINIVIL, ZESTRIL) 20 mg tablet Take 1 Tab by mouth daily. 30 Tab 5    glipiZIDE (GLUCOTROL) 10 mg tablet Take 1 Tab by mouth two (2) times a day. 60 Tab 5    ferrous sulfate 325 mg (65 mg iron) tablet Take 1 Tab by mouth Daily (before breakfast). Indications: Iron Deficiency Anemia 30 Tab 11    acetaminophen (TYLENOL ARTHRITIS PAIN) 650 mg CR tablet Take 1 Tab by mouth every eight (8) hours as needed for Pain. Indications: Arthritic Pain      Calcium Citrate-Vitamin D3 500 mg calcium -400 unit chew Take 1 Tab by mouth two (2) times a day. Indications: VITAMIN D DEFICIENCY 60 Tab 11    pitavastatin (LIVALO) 2 mg tablet Take 1 Tab by mouth daily.  90 Tab 0    OTHER Alphabetic vitamins take as directed       Allergies   Allergen Reactions    Codeine Nausea and Vomiting    Januvia [Sitagliptin] Nausea Only and Myalgia    Pcn [Penicillins] Swelling     Past Medical History:   Diagnosis Date    Anemia     Arthritis     Back pain     [facility]     Diabetes mellitus type 2, controlled ([facility] 6/30/2016    Diabetic eye exam ([facility] 06/22/2018    trace bilateral non prolif retinopathy Cataract OD    Diverticulosis of sigmoid colon 02/02/2018    High cholesterol     Hypertension     Right arm weakness 3/3/15    pending EMG 4/22/15    Right knee DJD 1980    Cortisone shots/Mobic/ original injury    Right sciatic nerve pain 3/3/15    on Dr. Josefina Aguilar Spider bite     brown recluse     Past Surgical History:   Procedure Laterality Date    COLONOSCOPY N/A 2/2/2018 COLONOSCOPY with Polypectomy performed by Odalys Rodas MD at [facility] f/u 5 years    DEBRIDE NECROTIC SKIN/ TISSUE, ABD WALL Left 2002    spider bite     HX CHOLECYSTECTOMY  1986     Family History   Problem Relation Age of Onset    Hypertension Mother     Diabetes Mother     Colon Cancer Father 72    Blindness Father         trauma    Diabetes Sister     Diabetes Brother      Social History     Tobacco Use    Smoking status: Never Smoker    Smokeless tobacco: Never Used   Substance Use Topics    Alcohol use: No       Review of Systems   Constitutional: Positive for chills, fatigue, fever (Subjective) and malaise/fatigue. Negative for weight loss. HENT: Positive for congestion. Eyes: Negative. Respiratory: Positive for cough. Negative for shortness of breath. Cardiovascular: Negative. Negative for chest pain, palpitations, orthopnea, claudication and leg swelling. Gastrointestinal: Negative. Negative for abdominal pain. Musculoskeletal: Positive for myalgias. Skin: Negative for rash. Neurological: Positive for headaches. Visit Vitals  /86   Pulse (!) 121   Temp 98.6 °F (37 °C)   Resp 20   Ht 5' 3\" (1.6 m)   Wt 197 lb 12.8 oz (89.7 kg)   SpO2 97%   BMI 35.04 kg/m²       Pain Scale: 0 - No pain/10    Pain Location:      Physical Exam   Constitutional: She is oriented to person, place, and time. She appears not lethargic, to not be writhing in pain, not malnourished and not dehydrated. She appears healthy. Non-toxic appearance. She does not have a sickly appearance. No distress. Cardiovascular: Regular rhythm and normal pulses. Tachycardia present. Pulmonary/Chest: Effort normal and breath sounds normal.   Abdominal: Soft. Normal appearance and normal aorta. There is no CVA tenderness. Neurological: She is oriented to person, place, and time. She appears not lethargic. Skin: She is diaphoretic.        Lab Results   Component Value Date/Time    WBC 6.7 05/31/2018 09:03 AM    HGB 10.5 (L) 05/31/2018 09:03 AM    HCT 34.2 (L) 05/31/2018 09:03 AM    PLATELET 344 69/71/5268 09:03 AM    MCV 89.8 05/31/2018 09:03 AM     Lab Results   Component Value Date/Time    Hemoglobin A1c 6.4 (H) 05/23/2019 07:25 AM    Hemoglobin A1c 7.2 (H) 02/19/2019 07:06 AM    Hemoglobin A1c 7.1 (H) 09/26/2018 08:59 AM    Glucose 93 02/19/2019 07:06 AM    Glucose (POC) 125 (H) 02/02/2018 11:03 AM    Microalbumin/Creat ratio (mg/g creat) 11 02/19/2019 07:06 AM    Microalbumin,urine random 0.66 02/19/2019 07:06 AM    LDL, calculated 72.8 02/19/2019 07:06 AM    Creatinine 1.09 02/19/2019 07:06 AM      Lab Results   Component Value Date/Time    Cholesterol, total 173 02/19/2019 07:06 AM    HDL Cholesterol 70 (H) 02/19/2019 07:06 AM    LDL, calculated 72.8 02/19/2019 07:06 AM    Triglyceride 151 (H) 02/19/2019 07:06 AM    CHOL/HDL Ratio 2.5 02/19/2019 07:06 AM     Lab Results   Component Value Date/Time    TSH 0.56 02/16/2015 09:19 AM    Triiodothyronine (T3), free 2.7 02/16/2015 09:19 AM    T4, Free 1.3 02/16/2015 09:19 AM      Lab Results   Component Value Date/Time    Sodium 142 02/19/2019 07:06 AM    Potassium 4.7 02/19/2019 07:06 AM    Chloride 107 02/19/2019 07:06 AM    CO2 26 02/19/2019 07:06 AM    Anion gap 9 02/19/2019 07:06 AM    Glucose 93 02/19/2019 07:06 AM    BUN 16 02/19/2019 07:06 AM    Creatinine 1.09 02/19/2019 07:06 AM    BUN/Creatinine ratio 15 02/19/2019 07:06 AM    GFR est AA >60 02/19/2019 07:06 AM    GFR est non-AA 51 (L) 02/19/2019 07:06 AM    Calcium 9.2 02/19/2019 07:06 AM    Bilirubin, total 0.6 02/19/2019 07:06 AM    ALT (SGPT) 21 02/19/2019 07:06 AM    AST (SGOT) 23 02/19/2019 07:06 AM    Alk. phosphatase 92 02/19/2019 07:06 AM    Protein, total 7.3 02/19/2019 07:06 AM    Albumin 4.0 02/19/2019 07:06 AM    Globulin 3.3 02/19/2019 07:06 AM    A-G Ratio 1.2 02/19/2019 07:06 AM       Pertinent Radiology reports:     ASSESSMENT and PLAN    ICD-10-CM ICD-9-CM    1. Tachycardia R00.0 785.0 EKG, 12 LEAD, INITIAL      INFLUENZA A & B AG (RAPID TEST)      TSH 3RD GENERATION      T4, FREE      METABOLIC PANEL, COMPREHENSIVE      CBC WITH AUTOMATED DIFF      URINALYSIS W/ RFLX MICROSCOPIC      XR CHEST PA LAT   2. Flu-like symptoms R68.89 780.99 EKG, 12 LEAD, INITIAL      INFLUENZA A & B AG (RAPID TEST)      TSH 3RD GENERATION      T4, FREE      METABOLIC PANEL, COMPREHENSIVE      CBC WITH AUTOMATED DIFF      URINALYSIS W/ RFLX MICROSCOPIC      XR CHEST PA LAT   3. Type 2 diabetes mellitus without complication, without long-term current use of insulin (HCC) E11.9 250.00 COLLECTION VENOUS BLOOD,VENIPUNCTURE   4. HTN, goal below 130/80 I10 401.9 COLLECTION VENOUS BLOOD,VENIPUNCTURE   5. Dyslipidemia E78.5 272.4 COLLECTION VENOUS BLOOD,VENIPUNCTURE      - Labs drawn today and flu- swab sent to lab  - CXR and ECG to be performed. - Seems to be flu-like related, Past 48 -72 hours for tamiflu medication so OTC treatment recommended until further notice. - Refer to ED for any emergent symptoms. There are no discontinued medications. Written instructions followed our verbal discussion of all information discussed above, pending tests ordered and future goals/plans. Patient expressed understanding of current diagnosis, planned testing, follow up and if needed to contact the office for any questions or concerns prior to the next visit.

## 2019-06-03 NOTE — PROGRESS NOTES
Patient concern with not feeling well since last Thursday. Can't sleep and feeling tired all the time. Patient name, date of birth and orders verified before specimen collection. Rt  arm is dry and intact. 21g butterfly  was utilized to collect specimen. Pt tolerated procedure well.

## 2019-06-04 DIAGNOSIS — N30.00 ACUTE CYSTITIS WITHOUT HEMATURIA: Primary | ICD-10-CM

## 2019-06-04 RX ORDER — SULFAMETHOXAZOLE AND TRIMETHOPRIM 800; 160 MG/1; MG/1
1 TABLET ORAL 2 TIMES DAILY
Qty: 10 TAB | Refills: 0 | Status: SHIPPED | OUTPATIENT
Start: 2019-06-04 | End: 2019-06-09

## 2019-06-06 ENCOUNTER — PATIENT OUTREACH (OUTPATIENT)
Dept: FAMILY MEDICINE CLINIC | Age: 64
End: 2019-06-06

## 2019-06-06 NOTE — PROGRESS NOTES
NN health screening:    I've mailed two gas cards to assist Ms Alejandra Vaughan in getting to her mammogram appointment with [facility].

## 2019-06-06 NOTE — PROGRESS NOTES
NN health screening:    Ms Kathryn Amezcua states she has not scheduled her mammogram through [facility] program yet because \"we keep calling and leaving messages with one another. \" Provided pt with [facility] # again as she states she \"lost it\" & she promised to call when we hang up. Encouraged her not to call on Fridays as they are not in the office.

## 2019-06-12 ENCOUNTER — OFFICE VISIT (OUTPATIENT)
Dept: FAMILY MEDICINE CLINIC | Age: 64
End: 2019-06-12

## 2019-06-12 VITALS
HEART RATE: 109 BPM | RESPIRATION RATE: 20 BRPM | SYSTOLIC BLOOD PRESSURE: 120 MMHG | TEMPERATURE: 98.1 F | WEIGHT: 197.8 LBS | HEIGHT: 63 IN | BODY MASS INDEX: 35.05 KG/M2 | DIASTOLIC BLOOD PRESSURE: 87 MMHG | OXYGEN SATURATION: 99 %

## 2019-06-12 DIAGNOSIS — N81.4 PROLAPSE OF UTERUS: ICD-10-CM

## 2019-06-12 DIAGNOSIS — Z76.0 MEDICATION REFILL: ICD-10-CM

## 2019-06-12 DIAGNOSIS — Z86.19 INFECTION RESOLVED: Primary | ICD-10-CM

## 2019-06-12 RX ORDER — CYCLOBENZAPRINE HCL 10 MG
TABLET ORAL
Qty: 30 TAB | Refills: 3 | Status: SHIPPED | OUTPATIENT
Start: 2019-06-12

## 2019-06-12 RX ORDER — LISINOPRIL 20 MG/1
20 TABLET ORAL DAILY
Qty: 30 TAB | Refills: 6 | Status: SHIPPED | OUTPATIENT
Start: 2019-06-12

## 2019-06-12 RX ORDER — METFORMIN HYDROCHLORIDE 1000 MG/1
1000 TABLET ORAL 2 TIMES DAILY WITH MEALS
Qty: 60 TAB | Refills: 6 | Status: SHIPPED | OUTPATIENT
Start: 2019-06-12

## 2019-06-12 RX ORDER — GLIPIZIDE 10 MG/1
10 TABLET ORAL 2 TIMES DAILY
Qty: 60 TAB | Refills: 6 | Status: SHIPPED | OUTPATIENT
Start: 2019-06-12

## 2019-06-12 RX ORDER — MELOXICAM 7.5 MG/1
TABLET ORAL
Qty: 30 TAB | Refills: 6 | Status: SHIPPED | OUTPATIENT
Start: 2019-06-12

## 2019-06-12 NOTE — PROGRESS NOTES
06/12/19    PCP: Luis Armando Duque NP    Chief Complaint   Patient presents with    Follow-up        HISTORY OF PRESENT ILLNESS  Aubrey Cortes  is a 61 y.o. female whom presents for Follow-up of UTI and fatigue. She reports completed Antibiotics and feels much better and energized. She reports her symptoms have completely resolved. She would like evaluation of her uterine prolapse as she now has Insurance and it is bothering her more. \"It feels like I'm birthing a baby. \"        Patient Active Problem List    Diagnosis Date Noted    Type 2 diabetes with nephropathy ([facility] 06/11/2018    Mild chronic anemia 06/30/2016    Diabetes mellitus type 2, controlled ([facility] 06/30/2016    BMI 36.0-36.9,adult 10/14/2015    Renovascular hypertension 06/01/2015    Anemia 03/03/2015    Hypomagnesemia 03/03/2015    Internal hemorrhoid 03/03/2015    Adult BMI 40.0-44.9 kg/sq m ([facility] 03/03/2015    Hyperlipidemia with target LDL less than 100 05/06/2014    Diverticulitis 12/10/2013    Bell's palsy 07/07/2013    GERD (gastroesophageal reflux disease) 11/28/2012     Current Outpatient Medications   Medication Sig Dispense Refill    lisinopril (PRINIVIL, ZESTRIL) 20 mg tablet Take 1 Tab by mouth daily. 30 Tab 6    glipiZIDE (GLUCOTROL) 10 mg tablet Take 1 Tab by mouth two (2) times a day. 60 Tab 6    pitavastatin calcium (LIVALO) 2 mg tablet Take 1 Tab by mouth daily. 30 Tab 6    meloxicam (MOBIC) 7.5 mg tablet 1-2 tabs daily as needed for knee pain. 30 Tab 6    metFORMIN (GLUCOPHAGE) 1,000 mg tablet Take 1 Tab by mouth two (2) times daily (with meals). 60 Tab 6    cyclobenzaprine (FLEXERIL) 10 mg tablet TAKE ONE TABLET NIGHTLY AS NEEDED FOR MUSCLE SPASM 30 Tab 3    magnesium oxide (MAG-OX) 400 mg tablet Take 1 Tab by mouth daily. 30 Tab 11    ferrous sulfate 325 mg (65 mg iron) tablet Take 1 Tab by mouth Daily (before breakfast).  Indications: Iron Deficiency Anemia 30 Tab 11    acetaminophen (TYLENOL ARTHRITIS PAIN) 650 mg CR tablet Take 1 Tab by mouth every eight (8) hours as needed for Pain. Indications: Arthritic Pain      Calcium Citrate-Vitamin D3 500 mg calcium -400 unit chew Take 1 Tab by mouth two (2) times a day. Indications: VITAMIN D DEFICIENCY 60 Tab 11    OTHER Alphabetic vitamins take as directed       Allergies   Allergen Reactions    Codeine Nausea and Vomiting    Januvia [Sitagliptin] Nausea Only and Myalgia    Pcn [Penicillins] Swelling     Past Medical History:   Diagnosis Date    Anemia     Arthritis     Back pain     [facility]     Diabetes mellitus type 2, controlled ([facility] 6/30/2016    [facility]2018    trace bilateral non prolif retinopathy Cataract OD    Diverticulosis of sigmoid colon 02/02/2018    High cholesterol     Hypertension     Right arm weakness 3/3/15    pending EMG 4/22/15    Right knee DJD 1980    Cortisone shots/Mobic/ original injury    Right sciatic nerve pain 3/3/15    on Dr. Yumiko Aguilar Spider bite     brown recluse     Past Surgical History:   Procedure Laterality Date    COLONOSCOPY N/A 2/2/2018    COLONOSCOPY with Polypectomy performed by Sharlene Heller MD at [facility] f/u 5 years    DEBRIDE NECROTIC SKIN/ TISSUE, ABD WALL Left 2002    spider bite     HX CHOLECYSTECTOMY  1986     Family History   Problem Relation Age of Onset    Hypertension Mother     Diabetes Mother     Colon Cancer Father 72    Blindness Father         trauma    Diabetes Sister     Diabetes Brother      Social History     Tobacco Use    Smoking status: Never Smoker    Smokeless tobacco: Never Used   Substance Use Topics    Alcohol use: No       Review of Systems   Constitutional: Negative for chills, fever, malaise/fatigue and weight loss. HENT: Negative. Respiratory: Negative. Cardiovascular: Negative. Genitourinary: Negative for dysuria, flank pain, frequency, hematuria and urgency. Neurological: Negative.         Visit Vitals  /87   Pulse (!) 109   Temp 98.1 °F (36.7 °C)   Resp 20   Ht 5' 3\" (1.6 m)   Wt 197 lb 12.8 oz (89.7 kg)   SpO2 99%   BMI 35.04 kg/m²       Pain Scale: 0 - No pain/10    Pain Location:      Physical Exam   Constitutional: She is oriented to person, place, and time and well-developed, well-nourished, and in no distress. HENT:   Head: Normocephalic. Eyes: Pupils are equal, round, and reactive to light. Neck: Normal range of motion. Neck supple. Cardiovascular: Normal rate, regular rhythm and normal heart sounds. Pulmonary/Chest: Effort normal and breath sounds normal.   Abdominal: Soft. Bowel sounds are normal.   Neurological: She is alert and oriented to person, place, and time. Skin: Skin is warm and dry. Psychiatric: Mood and affect normal.   Vitals reviewed.       Lab Results   Component Value Date/Time    WBC 8.7 06/03/2019 11:21 AM    HGB 11.8 (L) 06/03/2019 11:21 AM    HCT 38.1 06/03/2019 11:21 AM    PLATELET 022 03/39/6897 11:21 AM    MCV 90.7 06/03/2019 11:21 AM     Lab Results   Component Value Date/Time    Hemoglobin A1c 6.5 (H) 06/03/2019 11:21 AM    Hemoglobin A1c 6.4 (H) 05/23/2019 07:25 AM    Hemoglobin A1c 7.2 (H) 02/19/2019 07:06 AM    Glucose 125 (H) 06/03/2019 11:21 AM    Glucose (POC) 125 (H) 02/02/2018 11:03 AM    Microalbumin/Creat ratio (mg/g creat) 11 02/19/2019 07:06 AM    Microalbumin,urine random 0.66 02/19/2019 07:06 AM    LDL, calculated 72.8 02/19/2019 07:06 AM    Creatinine 1.33 (H) 06/03/2019 11:21 AM      Lab Results   Component Value Date/Time    Cholesterol, total 173 02/19/2019 07:06 AM    HDL Cholesterol 70 (H) 02/19/2019 07:06 AM    LDL, calculated 72.8 02/19/2019 07:06 AM    Triglyceride 151 (H) 02/19/2019 07:06 AM    CHOL/HDL Ratio 2.5 02/19/2019 07:06 AM     Lab Results   Component Value Date/Time    Sodium 144 06/03/2019 11:21 AM    Potassium 4.3 06/03/2019 11:21 AM    Chloride 110 (H) 06/03/2019 11:21 AM    CO2 27 06/03/2019 11:21 AM    Anion gap 7 06/03/2019 11:21 AM    Glucose 125 (H) 06/03/2019 11:21 AM    BUN 19 (H) 06/03/2019 11:21 AM    Creatinine 1.33 (H) 06/03/2019 11:21 AM    BUN/Creatinine ratio 14 06/03/2019 11:21 AM    GFR est AA 49 (L) 06/03/2019 11:21 AM    GFR est non-AA 40 (L) 06/03/2019 11:21 AM    Calcium 9.3 06/03/2019 11:21 AM    Bilirubin, total 0.5 06/03/2019 11:21 AM    ALT (SGPT) 15 06/03/2019 11:21 AM    AST (SGOT) 14 (L) 06/03/2019 11:21 AM    Alk. phosphatase 92 06/03/2019 11:21 AM    Protein, total 7.9 06/03/2019 11:21 AM    Albumin 4.1 06/03/2019 11:21 AM    Globulin 3.8 06/03/2019 11:21 AM    A-G Ratio 1.1 06/03/2019 11:21 AM      Lab Results   Component Value Date/Time    Hemoglobin A1c 6.5 (H) 06/03/2019 11:21 AM         ASSESSMENT and PLAN    ICD-10-CM ICD-9-CM    1. Infection resolved Z91.89 V15.89    2. Prolapse of uterus N81.4 618.1 REFERRAL TO UROGYNECOLOGY   3. Medication refill Z76.0 V68.1      Diagnoses and all orders for this visit:    1. Infection resolved    2. Prolapse of uterus  -     REFERRAL TO UROGYNECOLOGY    3. Medication refill    Other orders  -     lisinopril (PRINIVIL, ZESTRIL) 20 mg tablet; Take 1 Tab by mouth daily.  -     glipiZIDE (GLUCOTROL) 10 mg tablet; Take 1 Tab by mouth two (2) times a day. -     pitavastatin calcium (LIVALO) 2 mg tablet; Take 1 Tab by mouth daily. -     meloxicam (MOBIC) 7.5 mg tablet; 1-2 tabs daily as needed for knee pain. -     metFORMIN (GLUCOPHAGE) 1,000 mg tablet; Take 1 Tab by mouth two (2) times daily (with meals).   -     cyclobenzaprine (FLEXERIL) 10 mg tablet; TAKE ONE TABLET NIGHTLY AS NEEDED FOR MUSCLE SPASM          Medications Discontinued During This Encounter   Medication Reason    lisinopril (PRINIVIL, ZESTRIL) 20 mg tablet Reorder    glipiZIDE (GLUCOTROL) 10 mg tablet Reorder    pitavastatin (LIVALO) 2 mg tablet Reorder    meloxicam (MOBIC) 7.5 mg tablet Reorder    metFORMIN (GLUCOPHAGE) 1,000 mg tablet Reorder    cyclobenzaprine (FLEXERIL) 10 mg tablet Reorder       Written instructions followed our verbal discussion of all information discussed above, pending tests ordered and future goals/plans. Patient expressed understanding of current diagnosis, planned testing, follow up and if needed to contact the office for any questions or concerns prior to the next visit.

## 2019-07-11 ENCOUNTER — PATIENT OUTREACH (OUTPATIENT)
Dept: FAMILY MEDICINE CLINIC | Age: 64
End: 2019-07-11

## 2019-07-16 ENCOUNTER — PATIENT OUTREACH (OUTPATIENT)
Dept: FAMILY MEDICINE CLINIC | Age: 64
End: 2019-07-16

## 2019-07-18 ENCOUNTER — PATIENT OUTREACH (OUTPATIENT)
Dept: FAMILY MEDICINE CLINIC | Age: 64
End: 2019-07-18

## 2019-07-23 ENCOUNTER — PATIENT OUTREACH (OUTPATIENT)
Dept: FAMILY MEDICINE CLINIC | Age: 64
End: 2019-07-23

## 2019-07-23 ENCOUNTER — TELEPHONE (OUTPATIENT)
Dept: FAMILY MEDICINE CLINIC | Age: 64
End: 2019-07-23

## 2019-07-23 DIAGNOSIS — Z12.39 BREAST CANCER SCREENING: Primary | ICD-10-CM

## 2019-07-23 NOTE — TELEPHONE ENCOUNTER
NN health screening:    Ms Nafisa Cruz did get the gas cards and wants to send me a thank you card. Told her that wasn't necessary and I appreciate the sentiment. Ms Nafisa Cruz states \"because I have Medicaid now the [facility] program says I don't qualify. \" I've placed the order for mammogram and provided Ms Nafisa Cruz with CS# to schedule her mammogram. She has read and signed (via phone permission) Client Eligibility Program Form.

## 2019-07-23 NOTE — PROGRESS NOTES
NN health screening:    Called Ms Bill Garcia today in attempt to verify Ms Patricia Martinez is ok.

## 2019-08-03 ENCOUNTER — HOSPITAL ENCOUNTER (OUTPATIENT)
Dept: MAMMOGRAPHY | Age: 64
Discharge: HOME | End: 2019-08-03
Attending: NURSE PRACTITIONER
Payer: MEDICAID

## 2019-08-03 DIAGNOSIS — Z12.39 BREAST CANCER SCREENING: ICD-10-CM

## 2019-08-03 PROCEDURE — 77067 SCR MAMMO BI INCL CAD: CPT

## 2019-08-05 ENCOUNTER — PATIENT OUTREACH (OUTPATIENT)
Dept: FAMILY MEDICINE CLINIC | Age: 64
End: 2019-08-05

## 2019-08-05 NOTE — PROGRESS NOTES
NN health screening:    Negative mammogram now in chart. Awaiting for it to be updated in . Closed this episode of care.

## 2019-09-18 ENCOUNTER — OFFICE VISIT (OUTPATIENT)
Dept: FAMILY MEDICINE CLINIC | Age: 64
End: 2019-09-18

## 2019-09-18 VITALS
HEART RATE: 105 BPM | HEIGHT: 63 IN | DIASTOLIC BLOOD PRESSURE: 85 MMHG | TEMPERATURE: 97.8 F | SYSTOLIC BLOOD PRESSURE: 134 MMHG | WEIGHT: 195.6 LBS | RESPIRATION RATE: 20 BRPM | OXYGEN SATURATION: 100 % | BODY MASS INDEX: 34.66 KG/M2

## 2019-09-18 DIAGNOSIS — Z01.818 PRE-OP EVALUATION: Primary | ICD-10-CM

## 2019-09-18 DIAGNOSIS — Z76.89 ENCOUNTER FOR SUPPORT AND COORDINATION OF TRANSITION OF CARE: ICD-10-CM

## 2019-09-18 NOTE — PROGRESS NOTES
Preoperative Evaluation    Date of Exam: 2019    Adan Barrientos is a 61 y.o. female (:1955) who presents for preoperative evaluation for Vaginal hysterectomy, Cystocele Repair, sacrospinous ligament fixation, and possible recotocele repair 10/2/19. She was sent here today by [facility] Dr. Michelle Manzano. She had pre-op testing prescribed by surgeon at [facility] 19 (labs, ECG, and CXR). Latex Allergy: no    Problem List:     Patient Active Problem List    Diagnosis Date Noted    Cervical prolapse     Cystocele with prolapse     Type 2 diabetes with nephropathy ([address] 2018    Mild chronic anemia 2016    Diabetes mellitus type 2, controlled ([address] 2016    BMI 36.0-36.9,adult 10/14/2015    Renovascular hypertension 2015    Anemia 2015    Hypomagnesemia 2015    Internal hemorrhoid 2015    Adult BMI 40.0-44.9 kg/sq m ([address] 2015    Hyperlipidemia with target LDL less than 100 2014    Diverticulitis 12/10/2013    Bell's palsy 2013    GERD (gastroesophageal reflux disease) 2012     Medical History:     Past Medical History:   Diagnosis Date    Anemia     Arthritis     Back pain     Diabetes ([address]     Diabetes mellitus type 2, controlled ([address] 2016    [facility] exam [facility] 2018    trace bilateral non prolif retinopathy Cataract OD    Diverticulosis of sigmoid colon 2018    High cholesterol     Hypertension     Mixed stress and urge urinary incontinence 2019    grade 3 cystocele, known bladder prolapse    Right arm weakness 3/3/15    pending EMG 4/22/15    Right knee DJD     Cortisone shots/Mobic/ original injury    Right sciatic nerve pain 3/3/15    on Dr. Andrés Aguilar Spider bite     brown recluse     Allergies:      Allergies   Allergen Reactions    Codeine Nausea and Vomiting    Januvia [Sitagliptin] Nausea Only and Myalgia    Pcn [Penicillins] Swelling Medications:     Current Outpatient Medications   Medication Sig    trospium (SANCTURA XL) 60 mg capsule Take 1 Cap by mouth Daily (before breakfast).  lisinopril (PRINIVIL, ZESTRIL) 20 mg tablet Take 1 Tab by mouth daily.  glipiZIDE (GLUCOTROL) 10 mg tablet Take 1 Tab by mouth two (2) times a day.  pitavastatin calcium (LIVALO) 2 mg tablet Take 1 Tab by mouth daily.  meloxicam (MOBIC) 7.5 mg tablet 1-2 tabs daily as needed for knee pain.  metFORMIN (GLUCOPHAGE) 1,000 mg tablet Take 1 Tab by mouth two (2) times daily (with meals).  cyclobenzaprine (FLEXERIL) 10 mg tablet TAKE ONE TABLET NIGHTLY AS NEEDED FOR MUSCLE SPASM    magnesium oxide (MAG-OX) 400 mg tablet Take 1 Tab by mouth daily.  ferrous sulfate 325 mg (65 mg iron) tablet Take 1 Tab by mouth Daily (before breakfast). Indications: Iron Deficiency Anemia    acetaminophen (TYLENOL ARTHRITIS PAIN) 650 mg CR tablet Take 1 Tab by mouth every eight (8) hours as needed for Pain. Indications: Arthritic Pain    Calcium Citrate-Vitamin D3 500 mg calcium -400 unit chew Take 1 Tab by mouth two (2) times a day. Indications: VITAMIN D DEFICIENCY    OTHER Alphabetic vitamins take as directed     No current facility-administered medications for this visit.       Surgical History:     Past Surgical History:   Procedure Laterality Date    COLONOSCOPY N/A 2/2/2018    COLONOSCOPY with Polypectomy performed by Yocasta Hickman MD at [facility] f/u 5 years    DEBRIDE NECROTIC SKIN/ TISSUE, ABD WALL Left 2002    spider bite     HX CHOLECYSTECTOMY  1986     Social History:     Social History     Socioeconomic History    Marital status:      Spouse name: Not on file    Number of children: Not on file    Years of education: Not on file    Highest education level: Not on file   Occupational History    Occupation: CNA     Employer: [employer]     Comment: full time   Tobacco Use    Smoking status: Never Smoker    Smokeless tobacco: Never Used   Substance and Sexual Activity    Alcohol use: No    Drug use: No    Sexual activity: Never       Anesthesia Complications: None  History of abnormal bleeding : None  History of Blood Transfusions: no  Health Care Directive or Living Will: no    Objective:     ROS:   Feeling well. No dyspnea or chest pain on exertion. No abdominal pain, change in bowel habits, black or bloody stools. No urinary tract symptoms. GYN ROS: no breast pain or new or enlarging lumps on self exam, she complains of pelvic pain and some hotflashes. No neurological complaints. OBJECTIVE:   The patient appears well, alert, oriented x 3, in no distress. Visit Vitals  /85 (BP 1 Location: Left arm, BP Patient Position: Sitting)   Pulse (!) 105   Temp 97.8 °F (36.6 °C) (Oral)   Resp 20   Ht 5' 3\" (1.6 m)   Wt 195 lb 9.6 oz (88.7 kg)   SpO2 100%   BMI 34.65 kg/m²     HEENT:ENT normal.  Neck supple. No adenopathy or thyromegaly. [place]. Chest: Lungs are clear, good air entry, no wheezes, rhonchi or rales. Cardiovascular: S1 and S2 normal, no murmurs, regular rate and rhythm. Abdomen: soft without tenderness, guarding, mass or organomegaly. Extremities: show no edema, normal peripheral pulses. Neurological: is normal, no focal findings. PELVIC EXAM: examination not indicated      DIAGNOSTICS:   1. EKG: EKG FINDINGS - Results not available. 2. CXR: was negative for infiltrate, effusion, pneumothorax, or wide mediastinum  3. Labs: Reviewed and stable       IMPRESSION:   Diabetes mellitus- Stable with last A1C 6.4 on  6/03/19  Hypertension- Stable on treatment  Mixed Hyperlipidemia- Stable on treatment   Low risk for planned surgery  IF ECG comes back normal/stable there are No contraindications to planned surgery. Urology office ordered pre-op labs, ECG, and CXR and should review results prior to proceeding with procedure. Patient now has Medicaid and will also be transferring to [facility] accepting provider. ICD-10-CM ICD-9-CM    1. Pre-op evaluation Z01.818 V72.84    2.  Encounter for support and coordination of transition of care Z71.89 V65.49 REFERRAL TO PRIMARY CARE       Orders Placed This Encounter    REFERRAL TO PRIMARY CARE     Referral Priority:   Routine     Referral Type:   Consultation     Referral Reason:   Specialty Services Required     Referred to Provider:   Mamadou Kinsey MD     Number of Visits Requested:   1         Nino Jhaveri NP   9/18/2019

## 2019-09-18 NOTE — PROGRESS NOTES
Patient presents for Pre-Op exam for Vaginal Hysterectomy with prolapse repair scheduled October 2nd at [facility]. She has gone for EKG and bab yesterday at [facility].

## 2020-02-06 RX ORDER — METFORMIN HYDROCHLORIDE 1000 MG/1
TABLET ORAL
Qty: 60 TAB | Refills: 0 | OUTPATIENT
Start: 2020-02-06

## 2020-09-01 ENCOUNTER — HOSPITAL ENCOUNTER (OUTPATIENT)
Dept: GENERAL RADIOLOGY | Age: 65
Discharge: HOME | End: 2020-09-01
Payer: MEDICAID

## 2020-09-01 DIAGNOSIS — M25.512 LEFT SHOULDER PAIN: ICD-10-CM

## 2020-09-01 PROCEDURE — 73030 X-RAY EXAM OF SHOULDER: CPT

## 2021-05-05 ENCOUNTER — TRANSCRIBE ORDER (OUTPATIENT)
Dept: SCHEDULING | Age: 66
End: 2021-05-05

## 2021-05-05 DIAGNOSIS — Z78.0 POST-MENOPAUSAL: Primary | ICD-10-CM

## 2021-05-05 DIAGNOSIS — Z13.820 SCREENING FOR OSTEOPOROSIS: ICD-10-CM

## 2021-11-12 ENCOUNTER — TRANSCRIBE ORDER (OUTPATIENT)
Dept: SCHEDULING | Age: 66
End: 2021-11-12

## 2021-11-12 DIAGNOSIS — R10.13 EPIGASTRIC PAIN: ICD-10-CM

## 2021-11-12 DIAGNOSIS — R11.0 NAUSEA: Primary | ICD-10-CM

## 2021-11-12 DIAGNOSIS — R63.0 LOSS OF APPETITE: ICD-10-CM

## 2021-11-22 ENCOUNTER — HOSPITAL ENCOUNTER (OUTPATIENT)
Dept: CT IMAGING | Age: 66
Discharge: HOME | End: 2021-11-22
Attending: INTERNAL MEDICINE
Payer: MEDICARE

## 2021-11-22 DIAGNOSIS — R63.0 LOSS OF APPETITE: ICD-10-CM

## 2021-11-22 DIAGNOSIS — R11.0 NAUSEA: ICD-10-CM

## 2021-11-22 DIAGNOSIS — R10.13 EPIGASTRIC PAIN: ICD-10-CM

## 2021-11-22 LAB — CREAT UR-MCNC: 1 MG/DL (ref 0.6–1.3)

## 2021-11-22 PROCEDURE — 82565 ASSAY OF CREATININE: CPT

## 2021-11-22 PROCEDURE — 74011000636 HC RX REV CODE- 636: Performed by: INTERNAL MEDICINE

## 2021-11-22 PROCEDURE — 74160 CT ABDOMEN W/CONTRAST: CPT

## 2021-11-22 RX ADMIN — IOPAMIDOL 100 ML: 612 INJECTION, SOLUTION INTRAVENOUS at 11:23

## 2022-03-19 PROBLEM — E11.21 TYPE 2 DIABETES WITH NEPHROPATHY (HCC): Status: ACTIVE | Noted: 2018-06-11

## 2022-07-27 ENCOUNTER — TRANSCRIBE ORDER (OUTPATIENT)
Dept: SCHEDULING | Age: 67
End: 2022-07-27

## 2022-07-27 DIAGNOSIS — Z12.31 VISIT FOR SCREENING MAMMOGRAM: ICD-10-CM

## 2022-07-27 DIAGNOSIS — Z78.0 POST-MENOPAUSAL: Primary | ICD-10-CM

## 2022-07-27 DIAGNOSIS — Z00.00 HEALTH MAINTENANCE EXAMINATION: Primary | ICD-10-CM

## 2022-08-23 ENCOUNTER — TRANSCRIBE ORDER (OUTPATIENT)
Dept: SCHEDULING | Age: 67
End: 2022-08-23

## 2022-08-23 DIAGNOSIS — Z12.31 SCREENING MAMMOGRAM FOR BREAST CANCER: Primary | ICD-10-CM

## 2022-08-23 DIAGNOSIS — Z78.0 POST-MENOPAUSAL: Primary | ICD-10-CM

## 2022-09-04 NOTE — PROGRESS NOTES
Patient name, date of birth and orders verified before specimen collection. Rt  arm is dry and intact. 21g butterfly  was utilized to collect specimen. Pt tolerated procedure well.
Negative Screen

## 2023-01-31 DIAGNOSIS — Z78.0 POST-MENOPAUSAL: Primary | ICD-10-CM

## 2023-02-01 DIAGNOSIS — Z12.31 VISIT FOR SCREENING MAMMOGRAM: Primary | ICD-10-CM

## 2023-02-01 DIAGNOSIS — Z12.31 SCREENING MAMMOGRAM FOR BREAST CANCER: Primary | ICD-10-CM

## 2023-02-03 DIAGNOSIS — Z78.0 POST-MENOPAUSAL: Primary | ICD-10-CM

## 2023-02-05 DIAGNOSIS — Z78.0 POST-MENOPAUSAL: Primary | ICD-10-CM

## 2023-02-05 DIAGNOSIS — Z12.31 SCREENING MAMMOGRAM FOR BREAST CANCER: Primary | ICD-10-CM

## 2023-05-02 ENCOUNTER — HOSPITAL ENCOUNTER (OUTPATIENT)
Facility: HOSPITAL | Age: 68
Discharge: HOME | End: 2023-05-05
Payer: MEDICARE

## 2023-05-02 DIAGNOSIS — Z78.0 POST-MENOPAUSAL: ICD-10-CM

## 2023-05-02 DIAGNOSIS — Z12.31 VISIT FOR SCREENING MAMMOGRAM: ICD-10-CM

## 2023-05-02 PROCEDURE — 77063 BREAST TOMOSYNTHESIS BI: CPT

## 2023-05-02 PROCEDURE — 77080 DXA BONE DENSITY AXIAL: CPT

## 2023-08-08 NOTE — PROGRESS NOTES
Have You Had Microneedling Treatments Before?: has had a previous microneedling treatment Subjective:     Maryellen Macdonald is a 58 y.o. female seen for follow up of diabetes. She also has hypertension and hyperlipidemia. Diabetic Review of Systems - medication compliance: compliant all of the time, diabetic diet compliance: compliant all of the time, home glucose monitoring: is performed regularly, nonfasting values range 80-110s, further diabetic ROS: no polyuria or polydipsia, no chest pain, dyspnea or TIA's, no numbness, tingling or pain in extremities, last eye exam approximately 1 yr ago. Other symptoms and concerns:   Continued vaginal pressure and pain. She has a urethrocele and feels like its hanging out. She is doing kegel exercises. Denies any fever, abnormal bleeding. Current Outpatient Prescriptions   Medication Sig Dispense Refill    metFORMIN (GLUCOPHAGE) 1,000 mg tablet Take 1 Tab by mouth two (2) times daily (with meals). 60 Tab 4    lisinopril (PRINIVIL, ZESTRIL) 20 mg tablet Take 1 Tab by mouth daily. 30 Tab 3    glipiZIDE (GLUCOTROL) 10 mg tablet Take 1 Tab by mouth two (2) times a day. 60 Tab 4    cyclobenzaprine (FLEXERIL) 10 mg tablet TAKE ONE TABLET BY MOUTH NIGHTLY FOR MUSCLE SPASM 30 Tab 2    magnesium oxide (MAG-OX) 400 mg tablet Take 1 Tab by mouth daily. 30 Tab 11    acetaminophen (TYLENOL ARTHRITIS PAIN) 650 mg CR tablet Take 1 Tab by mouth every eight (8) hours as needed for Pain. Indications: Arthritic Pain      Calcium Citrate-Vitamin D3 500 mg calcium -400 unit chew Take 1 Tab by mouth two (2) times a day. Indications: VITAMIN D DEFICIENCY 60 Tab 11    pitavastatin (LIVALO) 2 mg tablet Take 1 Tab by mouth daily. 90 Tab 0    ferrous sulfate 325 mg (65 mg iron) tablet Take 1 Tab by mouth Daily (before breakfast).  Indications: IRON DEFICIENCY ANEMIA 30 Tab 11    OTHER Alphabetic vitamins take as directed       Allergies   Allergen Reactions    Codeine Nausea and Vomiting    Januvia [Sitagliptin] Nausea Only and Myalgia    Pcn [Penicillins] Swelling Past Medical History:   Diagnosis Date    Anemia     Arthritis     Back pain     Diabetes [facility]     Diabetes mellitus type 2, controlled ([facility] 6/30/2016    [facility] 2016    High cholesterol     Hypertension     Right arm weakness 3/3/15    pending EMG 4/22/15    Right knee DJD 1980    Cortisone shots/Mobic/ original injury    Right sciatic nerve pain 3/3/15    on Dr. Aleyda Aguilar Spider bite     brown recluse     Past Surgical History:   Procedure Laterality Date    DEBRIDE NECROTIC SKIN/ TISSUE, ABD WALL Left 2002    spider bite     HX CHOLECYSTECTOMY  1986     Family History   Problem Relation Age of Onset    Hypertension Mother     Diabetes Mother     Diabetes Sister     Diabetes Brother     Colon Cancer Father 72    Blindness Father      trauma     Social History   Substance Use Topics    Smoking status: Never Smoker    Smokeless tobacco: Never Used    Alcohol use No        Lab Results  Component Value Date/Time   WBC 6.5 01/09/2018 07:23 AM   HGB 10.6 01/09/2018 07:23 AM   HCT 34.2 01/09/2018 07:23 AM   PLATELET 319 64/32/7959 07:23 AM   MCV 89.3 01/09/2018 07:23 AM     Lab Results  Component Value Date/Time   Hemoglobin A1c 6.6 01/09/2018 07:23 AM   Hemoglobin A1c 6.9 08/29/2017 07:14 AM   Hemoglobin A1c 7.4 05/22/2017 07:43 AM   Glucose 113 01/09/2018 07:23 AM   Glucose  03/03/2015 09:34 AM   Microalbumin/Creat ratio (mg/g creat) 14 01/09/2018 07:23 AM   Microalbumin,urine random 1.02 01/09/2018 07:23 AM   LDL, calculated 75.8 01/09/2018 07:23 AM   Creatinine 0.94 01/09/2018 07:23 AM      Lab Results  Component Value Date/Time   Cholesterol, total 187 01/09/2018 07:23 AM   HDL Cholesterol 90 01/09/2018 07:23 AM   LDL, calculated 75.8 01/09/2018 07:23 AM   Triglyceride 106 01/09/2018 07:23 AM   CHOL/HDL Ratio 2.1 01/09/2018 07:23 AM     Lab Results   Component Value Date/Time    Sodium 142 01/09/2018 07:23 AM    Potassium 4.4 01/09/2018 07:23 AM When Was Your Last Treatment?: 2019 Chloride 104 01/09/2018 07:23 AM    CO2 29 01/09/2018 07:23 AM    Anion gap 9 01/09/2018 07:23 AM    Glucose 113 01/09/2018 07:23 AM    BUN 15 01/09/2018 07:23 AM    Creatinine 0.94 01/09/2018 07:23 AM    BUN/Creatinine ratio 16 01/09/2018 07:23 AM    GFR est AA >60 01/09/2018 07:23 AM    GFR est non-AA >60 01/09/2018 07:23 AM    Calcium 9.4 01/09/2018 07:23 AM    Bilirubin, total 0.7 01/09/2018 07:23 AM    ALT (SGPT) 21 01/09/2018 07:23 AM    AST (SGOT) 20 01/09/2018 07:23 AM    Alk. phosphatase 72 01/09/2018 07:23 AM    Protein, total 7.2 01/09/2018 07:23 AM    Albumin 3.7 01/09/2018 07:23 AM    Globulin 3.5 01/09/2018 07:23 AM    A-G Ratio 1.1 01/09/2018 07:23 AM         Review of Systems  Constitutional: negative  Eyes: negative  Ears, nose, mouth, throat, and face: negative  Respiratory: negative  Cardiovascular: negative  Gastrointestinal: negative  Genitourinary:positive for Vaginal pressure  Hematologic/lymphatic: negative  Musculoskeletal:negative  Neurological: negative  Behavioral/Psych: positive for sleep disturbance  Endocrine: negative    Objective:     Visit Vitals    /87 (BP 1 Location: Left arm, BP Patient Position: Sitting)    Pulse 98    Temp 98.1 °F (36.7 °C) (Oral)    Resp 12    Ht 5' 3\" (1.6 m)    Wt 199 lb (90.3 kg)    SpO2 100%    BMI 35.25 kg/m2     Appearance: alert, well appearing, and in no distress. Exam: fundi poorly visualized, heart sounds , chest clear, no hepatosplenomegaly, no carotid bruitsnormal rate, regular rhythm, normal S1, S2, no murmurs, rubs, clicks or gallops  Lab review: labs are reviewed, up to date and normal.    Assessment/Plan:     diabetes well controlled, stable, hypertension stable, hyperlipidemia stable.   Diabetic issues reviewed with her: low cholesterol diet, weight control and daily exercise discussed, home glucose monitoring emphasized, all medications, side effects and compliance discussed carefully, foot care discussed and Podiatry visits discussed, annual eye examinations at Ophthalmology discussed and labs immediately prior to next visit. ICD-10-CM ICD-9-CM    1. Controlled diabetes mellitus type 2 with complications, unspecified long term insulin use status ([facility]) E11.8 250.90 CBC W/O DIFF      LIPID PANEL      HEMOGLOBIN A1C WITH EAG      METABOLIC PANEL, COMPREHENSIVE   2. Mild chronic anemia D64.9 285.9    3. Hyperlipidemia with target LDL less than 100 E78.5 272.4 LIPID PANEL   4. Insomnia, unspecified type G47.00 780.52    5. Urethrocele, female N81.0 618.03 REFERRAL TO OBSTETRICS AND GYNECOLOGY   6. Controlled diabetes mellitus type 2 with complications, unspecified long term insulin use status E11.8 250.90 metFORMIN (GLUCOPHAGE) 1,000 mg tablet      lisinopril (PRINIVIL, ZESTRIL) 20 mg tablet      glipiZIDE (GLUCOTROL) 10 mg tablet   7. Decreased GFR R94.4 794.4 lisinopril (PRINIVIL, ZESTRIL) 20 mg tablet   8. Elevated BUN R79.9 790.6 lisinopril (PRINIVIL, ZESTRIL) 20 mg tablet   9. HTN, goal below 140/90 I10 401.9 lisinopril (PRINIVIL, ZESTRIL) 20 mg tablet     Diagnoses and all orders for this visit:    1. Controlled diabetes mellitus type 2 with complications, unspecified long term insulin use status ([facility])  -     CBC; Future  -     LIPID PANEL; Future  -     HEMOGLOBIN A1C; Future  -     COMP METABOLIC PANEL; Future  -     metFORMIN (GLUCOPHAGE) 1,000 mg tablet; Take 1 Tab by mouth two (2) times daily (with meals). -     lisinopril (PRINIVIL, ZESTRIL) 20 mg tablet; Take 1 Tab by mouth daily.  -     glipiZIDE (GLUCOTROL) 10 mg tablet; Take 1 Tab by mouth two (2) times a day. 2. Mild chronic anemia    3. Hyperlipidemia with target LDL less than 100  -     LIPID PANEL; Future    4. Insomnia, unspecified type    5. Urethrocele, female  -     REFERRAL TO OBSTETRICS AND GYNECOLOGY    6. Controlled diabetes mellitus type 2 with complications, unspecified long term insulin use status  -     CBC; Future  -     LIPID PANEL;  Future  - HEMOGLOBIN A1C; Future  -     COMP METABOLIC PANEL; Future  -     metFORMIN (GLUCOPHAGE) 1,000 mg tablet; Take 1 Tab by mouth two (2) times daily (with meals). -     lisinopril (PRINIVIL, ZESTRIL) 20 mg tablet; Take 1 Tab by mouth daily.  -     glipiZIDE (GLUCOTROL) 10 mg tablet; Take 1 Tab by mouth two (2) times a day. 7. Decreased GFR  -     lisinopril (PRINIVIL, ZESTRIL) 20 mg tablet; Take 1 Tab by mouth daily. 8. Elevated BUN  -     lisinopril (PRINIVIL, ZESTRIL) 20 mg tablet; Take 1 Tab by mouth daily. 9. HTN, goal below 140/90  -     lisinopril (PRINIVIL, ZESTRIL) 20 mg tablet; Take 1 Tab by mouth daily. Follow-up Disposition:  Return in about 6 months (around 7/15/2018).   cardiovascular risk and specific lipid/LDL goals reviewed  reviewed medications and side effects in detail 08-Aug-2023 01:30

## 2024-04-10 NOTE — TELEPHONE ENCOUNTER
Medication: Livalo , dose: 2 mg, how often: Daily , current number of medication days provided: 90, refill per application. Lot #: [id number], EXP 09/2019. This medication was received and verified for the following 1. Correct Patient, 2. Correct Diagnosis, 3. Correct Drug, 4. Correct route, and no current allergy to medication. Please contact patient to come  their medications.      Amando Wilson MSN, RN, FNP-C     [facility]

## (undated) DEVICE — AIRLIFE™ NASAL OXYGEN CANNULA CURVED, NONFLARED TIP WITH 14 FOOT (4.3 M) CRUSH-RESISTANT TUBING, OVER-THE-EAR STYLE: Brand: AIRLIFE™

## (undated) DEVICE — SYRINGE MED 25GA 3ML L5/8IN SUBQ PLAS W/ DETACH NDL SFTY

## (undated) DEVICE — GAUZE SPONGES,16 PLY: Brand: CURITY

## (undated) DEVICE — GOWN ISOL IMPERV UNIV, DISP, OPEN BACK, BLUE --

## (undated) DEVICE — ENDOSCOPY PUMP TUBING/ CAP SET: Brand: ERBE

## (undated) DEVICE — (D)SYR 10ML 1/5ML GRAD NSAF -- PKGING CHANGE USE ITEM 338027

## (undated) DEVICE — CATHETER SUCT TR FL TIP 14FR W/ O CTRL

## (undated) DEVICE — SYR 50ML SLIP TIP NSAF LF STRL --

## (undated) DEVICE — CANNULA ORIG TL CLR W FOAM CUSHIONS AND 14FT SUPL TB 3 CHN

## (undated) DEVICE — SYRINGE MED 20ML STD CLR PLAS LUERLOCK TIP N CTRL DISP

## (undated) DEVICE — FLEX ADVANTAGE 3000CC: Brand: FLEX ADVANTAGE

## (undated) DEVICE — SOLUTION IRRIG 1000ML H2O STRL BLT

## (undated) DEVICE — TRAP SPEC COLL POLYP POLYSTYR --

## (undated) DEVICE — SNARE POLYP M W27MMXL240CM OVL STIFF DISP CAPTIVATOR

## (undated) DEVICE — (D)GLOVE EXAM LG NITRL NS -- DISC BY MFR NO SUB

## (undated) DEVICE — MEDI-VAC NON-CONDUCTIVE SUCTION TUBING: Brand: CARDINAL HEALTH

## (undated) DEVICE — MEDI-VAC SUCTION HIGH CAPACITY: Brand: CARDINAL HEALTH

## (undated) DEVICE — FLUFF AND POLYMER UNDERPAD,EXTRA HEAVY: Brand: WINGS